# Patient Record
(demographics unavailable — no encounter records)

---

## 2017-02-15 NOTE — FL
EXAMINATION TYPE: FL guided pain mgmt statistic

 

DATE OF EXAM: 2/15/2017 9:48 AM

 

CLINICAL HISTORY: Neck pain.

 

TECHNIQUE: Fluoroscopy.

 

COMPARISON:  None.

 

FINDINGS:  Fluoroscopic guidance was provided during pain relief procedure performed by Dr. Bass .  
A total of 20 seconds of fluoroscopic time was utilized during the procedure and three spot images ar
e acquired. Images acquired shows needle localization  at multiple levels of the cervical facets.

 

IMPRESSION:  As Above.

## 2017-02-15 NOTE — P.PCN
Date of Procedure: 02/15/17


Surgeon: Neftaly Bass


Pathology: none sent


Condition: stable


Disposition: PACU


Description of Procedure: 


PREOPERATIVE DIAGNOSIS: Cervical spondylosis without myelopathy and facet 

arthropathy.





POSTOPERATIVE DIAGNOSIS: Cervical spondylosis without myelopathy and facet 

arthropathy.





PROCEDURES: Radiofrequency thermocoagulation, C2-C3, C3-C4 medial branch and 

third occipital nerve, with fluoroscopic guidance, right side.





ANESTHESIA: Local with 1% lidocaine; conscious sedation 





EBL: Minimal





PROCEDURE INDICATION: The patient with neck pain secondary to cervical 

arthropathy who had more than 50% relief of her pain with previous diagnostic 

cervical medial branch block; patient has also had multiple cervical RFAs with 

excellent relief. No use of blood thinners.





PROCEDURE DESCRIPTION / TECHNIQUE: The patient was seen and identified in the 

preoperative area. Risks, benefits, complications, and alternatives were 

discussed with the patient (with risks including but not limited to bleeding, 

infection, nerve damage, incomplete pain relief, and allergic reactions to 

medications), the patient agreed to proceed with the procedure and signed the 

informed consent after all questions were answered. IV was started. Vital signs 

remained stable throughout the procedure.





Patient was taken to the OR and time out was completed. The patient was placed 

in the prone position on the procedure table. A pillow was placed under the 

patients chest to increase the cervical interlaminar space. The cervical area 

was prepped and draped in the usual sterile fashion. Critical pause was taken. 

Vital signs were closely monitored during the procedure. Conscious sedation was 

used during the procedure to decrease patients anxiety. 





Using cross-table lateral fluoroscopy, the centroid of the trapezoid of C2, C3, 

and C4  were identified, marked, and localized with 1% lidocaine.  Subsequently

, a 21 gauge 100-mm radiofrequency cannula with a 5-mm active tip was advanced 

guided by fluoroscopy to the centroid of the trapezoid of C3 and C4, and the C2-

C3 facet joint. Needle tip position was confirmed at these locations with 

anteroposterior fluoroscopy. Each site then underwent sensory testing at 50 Hz 

and 0 to 1 volt  and motor testing at 2 Hz and 0 to 3 volt with local 

stimulation, but no radicular symptoms down the arm. Thereafter C3, C4, and TON 

sites underwent radiofrequency thermocoagulation  at 80 degrees celsius for 90 

seconds after injecting 0.5 ml of PF lidocaine 1%. After thermocoagulation, 1 

ml of the block solution containing Decadron 10 mg and 2 mL of preservative-

free normal saline was injected at the C3, C4, and TON levels after negative 

aspiration of CSF and blood and with no paresthesias. Cannulas were retracted 

while injecting lidocaine 1% until the needle is out. Skin was cleansed and 

bandages were applied.





COMPLICATIONS: No acute complications.





COMMENTS:





DISPOSITION / PLANS: The patient was placed in a supine position and  

transferred to the recovery area in a stable condition for observation  and was 

discharged from the recovery room after meeting discharge criteria. Home 

discharge instructions given to the patient by the staff. The patient was 

reexamined prior to discharge. The patient will schedule a follow up for left 

cervical RFA in 4-6 weeks.

## 2017-02-17 NOTE — CDI
Dear. Dr. Bass,



Per your procedure note, Conscious Sedation is documented.  The Pain Procedure 
Record, however, has MAC checked off under Anesthesia Plan.  



This is conflicting documentation that needs clarification for proper reporting 
purposes. 



Please clarify if the anesthesia provided for CHANTEL Garcia was MAC(Monitored 
Anesthesia Care) or Conscious/Moderate Sedation.



Please document this clarification on an addendum to the procedure note. 



Thank you for your time



Loretta Abreu,CPC

Outpatient 

[facility] [facility] [employer]

[email or web address]
[email or web address]

## 2017-05-02 NOTE — P.PN
Progress Note - Text


Patient returns for followup for chronic neck pain and headaches.  Patient 

underwent bilateral cervical RFA in February and April, and has gotten good 

relief from R side but not from left side.  Patient continues on Norco and 

Fioricet medications for pain with good relief.  Patient denies adverse drug 

effects from medications.  Today, pt denies new-onset weakness, bowel/bladder 

incontinence, or any other signs or symptoms of cauda equina syndrome. There 

are no signs of acute intoxication, and no indications of medication diversion 

or overuse.





In addition to above, 13-point review of systems is also negative for chest pain

, shortness of breath, changes in vision, changes in hearing, new onset weakness

, abdominal pain, diarrhea, extreme fatigue, malaise, fever, skin changes, 

homicidal or suicidal ideation, or bowel or bladder incontinence.





Vital Signs:  Reviewed in EMR


Gen:  WDWN, AAOx3, NAD


HEENT:  NCAT, EOMI, hearing grossly normal


Pulm:  resp unlabored


Abd:  soft, NT, ND





Neck:  supple, trachea midline





ROM in flexion cervical spine:  reduced


ROM in extension cervical spine: reduced  


Cervical paravertebral tenderness: +  


Cervical Facet tenderness:  ++


Spurling's:  + RUE > LUE


Upper extremity:  decreased  strength, decreased shoulder abduction ROM, 

and decreased elbow flexion/extension secondary to pain 





Neuro:  CN II-XII grossly intact, muscle strength lower extremities PRESERVED





Imaging:  Reviewed in EMR





Assessment:


1. cervical spondylosis without myelopathy


2. cervical spinal stenosis


3. chronic pain syndrome





Plan:





1. Explanation:  Opioid and psychological risk scores were reviewed.  Diagnoses

, prognoses, and multiple treatment options including but not limited to 

physical therapy, interventional therapies, adjuvant medical therapies, 

narcotic medication therapies, and surgery were discussed with the patient and 

all questions were answered to the patient's satisfaction.





2. Opioid agreement: Patient has previously signed narcotic agreement, and was 

orally counseled to not overuse, abuse, divert, or cell medications, and to 

take them as prescribed by only 1 healthcare provider.  The patient was also 

counseled to store opioid medications in a safe and preferably locked location.

  Patient was also counseled against driving or operating heavy equipment while 

using narcotic medications and also to not use alcohol or any illicit or 

recreational drugs.  The patient verbalized understanding that lack of 

compliance with any of the above and likely result in failure to renew narcotic 

prescriptions, possible discharge from the clinic, and possible legal 

ramifications thereafter if indicated.





3. Counseling:  The patient was counseled extensively on SMOKING CESSATION, 

BODY MASS INDEX, EXERCISE.  Specifically, the patient was instructed regarding 

the importance of smoking cessation, obesity, and exercise in the context of 

both chronic pain and overall health.





4. Procedures:  none for now





5. Consultations:  None





6. Investigations:  None





7. Medications:  Norco 7.5 #60 for two months' supply, Fioricet #30 for two 

months' supply





8. Disposition:  f/u for re-eval 8 weeks





PQRS measures:





1-Patient's medications are documented in the chart.


2-Tobacco use is positive, counseling given


3-Patient has not had a pneumococcal vaccine.


4-Advanced care planning discussed, patient unable to give.


5-Opioid contract signed with the patient today.


6-Pain positive, follow-up visit or procedure scheduled


7-Patient's blood pressure measured and documented, and patient will follow up 

with the primary care due to hypertension.


8-Patient's weight was measured, and body mass index ABOVE the normal limits, 

and counseling was done.  Patient instructed to follow up with PCP.


9-Patient WAS NOT identified as an unhealthy alcohol user.

## 2017-06-27 NOTE — P.PN
Progress Note - Text


Patient returns for followup for chronic neck pain and headaches.  Patient 

underwent bilateral cervical RFA in February and April, and has gotten good 

relief from R side but not from left side.  Patient continues on Norco and 

Fioricet medications for pain with good relief.  Patient denies adverse drug 

effects from medications.  Today, pt denies new-onset weakness, bowel/bladder 

incontinence, or any other signs or symptoms of cauda equina syndrome. There 

are no signs of acute intoxication, and no indications of medication diversion 

or overuse.





In addition to above, 13-point review of systems is also negative for chest pain

, shortness of breath, changes in vision, changes in hearing, new onset weakness

, abdominal pain, diarrhea, extreme fatigue, malaise, fever, skin changes, 

homicidal or suicidal ideation, or bowel or bladder incontinence.





Vital Signs:  Reviewed in EMR


Gen:  WDWN, AAOx3, NAD


HEENT:  NCAT, EOMI, hearing grossly normal, TTP over occipital ridges


Pulm:  resp unlabored


Abd:  soft, NT, ND





Neck:  supple, trachea midline





ROM in flexion cervical spine:  reduced


ROM in extension cervical spine: reduced  


Cervical paravertebral tenderness: +  


Cervical Facet tenderness:  ++


Spurling's:  + RUE > LUE


Upper extremity:  decreased  strength secondary to pain





Neuro:  CN II-XII grossly intact, muscle strength lower extremities PRESERVED





Imaging:  Reviewed in EMR





Assessment:


1. cervical spondylosis without myelopathy


2. cervical spinal stenosis


3. chronic pain syndrome





Plan:





1. Explanation:  Opioid and psychological risk scores were reviewed.  Diagnoses

, prognoses, and multiple treatment options including but not limited to 

physical therapy, interventional therapies, adjuvant medical therapies, 

narcotic medication therapies, and surgery were discussed with the patient and 

all questions were answered to the patient's satisfaction.





2. Opioid agreement: Patient has previously signed narcotic agreement, and was 

orally counseled to not overuse, abuse, divert, or cell medications, and to 

take them as prescribed by only 1 healthcare provider.  The patient was also 

counseled to store opioid medications in a safe and preferably locked location.

  Patient was also counseled against driving or operating heavy equipment while 

using narcotic medications and also to not use alcohol or any illicit or 

recreational drugs.  The patient verbalized understanding that lack of 

compliance with any of the above and likely result in failure to renew narcotic 

prescriptions, possible discharge from the clinic, and possible legal 

ramifications thereafter if indicated.





3. Counseling:  The patient was counseled extensively on SMOKING CESSATION, 

BODY MASS INDEX, EXERCISE.  Specifically, the patient was instructed regarding 

the importance of smoking cessation, obesity, and exercise in the context of 

both chronic pain and overall health.





4. Procedures:  none for now





5. Consultations:  None





6. Investigations:  None





7. Medications:  Norco 7.5 #60 + 7.5 #30 for three months' supply, Fioricet #30 

for two months' supply





8. Disposition:  f/u for re-eval 12 weeks





PQRS measures:





1-Patient's medications are documented in the chart.


2-Tobacco use is positive, counseling given


3-Patient has not had a pneumococcal vaccine.


4-Advanced care planning discussed, patient unable to give.


5-Opioid contract signed with the patient today.


6-Pain positive, follow-up visit or procedure scheduled


7-Patient's blood pressure measured and documented, and patient will follow up 

with the primary care due to hypertension.


8-Patient's weight was measured, and body mass index ABOVE the normal limits, 

and counseling was done.  Patient instructed to follow up with PCP.


9-Patient WAS NOT identified as an unhealthy alcohol user.

## 2017-09-19 NOTE — P.PN
Progress Note - Text


Patient returns for followup for chronic neck pain and headaches.  Patient 

underwent bilateral cervical RFA in February and April, and pain is starting to 

return.  Patient also had some right shoulder pain and was given a Medrol dose 

eneida which helped substantially.  Patient continues on Norco and Fioricet 

medications for pain with good relief.  Patient denies adverse drug effects 

from medications.  Today, pt denies new-onset weakness, bowel/bladder 

incontinence, or any other signs or symptoms of cauda equina syndrome. There 

are no signs of acute intoxication, and no indications of medication diversion 

or overuse.





In addition to above, 13-point review of systems is also negative for chest pain

, shortness of breath, changes in vision, changes in hearing, new onset weakness

, abdominal pain, diarrhea, extreme fatigue, malaise, fever, skin changes, 

homicidal or suicidal ideation, or bowel or bladder incontinence.





Vital Signs:  Reviewed in EMR


Gen:  WDWN, AAOx3, NAD


HEENT:  NCAT, EOMI, hearing grossly normal, TTP over occipital ridges


Pulm:  resp unlabored


Abd:  soft, NT, ND





Neck:  supple, trachea midline





ROM in flexion cervical spine:  reduced


ROM in extension cervical spine: reduced  


Cervical paravertebral tenderness: +  


Cervical Facet tenderness:  ++, L >> R


Spurling's:  + LUE


Upper extremity:  decreased  strength secondary to pain





Neuro:  CN II-XII grossly intact, muscle strength lower extremities PRESERVED





Imaging:  Reviewed in EMR





Assessment:


1. cervical spondylosis without myelopathy


2. cervical spinal stenosis


3. chronic pain syndrome





Plan:





1. Explanation:  Opioid and psychological risk scores were reviewed.  Diagnoses

, prognoses, and multiple treatment options including but not limited to 

physical therapy, interventional therapies, adjuvant medical therapies, 

narcotic medication therapies, and surgery were discussed with the patient and 

all questions were answered to the patient's satisfaction.





2. Opioid agreement: Patient has previously signed narcotic agreement, and was 

orally counseled to not overuse, abuse, divert, or cell medications, and to 

take them as prescribed by only 1 healthcare provider.  The patient was also 

counseled to store opioid medications in a safe and preferably locked location.

  Patient was also counseled against driving or operating heavy equipment while 

using narcotic medications and also to not use alcohol or any illicit or 

recreational drugs.  The patient verbalized understanding that lack of 

compliance with any of the above and likely result in failure to renew narcotic 

prescriptions, possible discharge from the clinic, and possible legal 

ramifications thereafter if indicated.





3. Counseling:  The patient was counseled extensively on SMOKING CESSATION, 

BODY MASS INDEX, EXERCISE.  Specifically, the patient was instructed regarding 

the importance of smoking cessation, obesity, and exercise in the context of 

both chronic pain and overall health.





4. Procedures:  none for now





5. Consultations:  None





6. Investigations:  None





7. Medications:  Norco 7.5 #60 for two months' supply, Fioricet #30 for two 

months' supply, baclofen 10 mg #90 with two refills





8. Disposition:  f/u for re-eval 12 weeks.  The patient was again warned 

regarding the synergistic effects of benzodiazepines and opioids in terms of 

sedation, nausea, and respiratory depression possibly resulting in death.  The 

patient verbalized understanding and acceptance of these risks.





PQRS measures:





1-Patient's medications are documented in the chart.


2-Tobacco use is positive, counseling given


3-Patient has not had a pneumococcal vaccine.


4-Advanced care planning discussed, patient unable to give.


5-Opioid contract signed with the patient today.


6-Pain positive, follow-up visit or procedure scheduled


7-Patient's blood pressure measured and documented, and patient will follow up 

with the primary care due to hypertension.


8-Patient's weight was measured, and body mass index ABOVE the normal limits, 

and counseling was done.  Patient instructed to follow up with PCP.


9-Patient WAS NOT identified as an unhealthy alcohol user.

## 2017-10-16 NOTE — P.PCN
Date of Procedure: 10/16/17


Surgeon: Neftaly Bass


Pathology: none sent


Condition: stable


Disposition: PACU


Description of Procedure: 


PREOPERATIVE DIAGNOSIS: Cervical spondylosis without myelopathy and facet 

arthropathy.





POSTOPERATIVE DIAGNOSIS: Cervical spondylosis without myelopathy and facet 

arthropathy.





PROCEDURES: Radiofrequency thermocoagulation, C2-C3, C3-C4 medial branch and 

third occipital nerve, with fluoroscopic guidance, left side.





ANESTHESIA: Local with 1% lidocaine; conscious sedation 





EBL: Minimal





PROCEDURE INDICATION: The patient with neck pain secondary to cervical 

arthropathy who had more than 50% relief of her pain with previous diagnostic 

cervical medial branch block; patient has also had multiple cervical RFAs with 

excellent relief in the past. No use of blood thinners.





PROCEDURE DESCRIPTION / TECHNIQUE: The patient was seen and identified in the 

preoperative area. Risks, benefits, complications, and alternatives were 

discussed with the patient (with risks including but not limited to bleeding, 

infection, nerve damage, incomplete pain relief, and allergic reactions to 

medications), the patient agreed to proceed with the procedure and signed the 

informed consent after all questions were answered. IV was started. Vital signs 

remained stable throughout the procedure.





Patient was taken to the OR and time out was completed. The patient was placed 

in the prone position on the procedure table. A pillow was placed under the 

patients chest to increase the cervical interlaminar space. The cervical area 

was prepped and draped in the usual sterile fashion. Critical pause was taken. 

Vital signs were closely monitored during the procedure. Conscious sedation was 

used during the procedure to decrease patients anxiety. 





Using cross-table lateral fluoroscopy, the centroid of the trapezoid of C2, C3, 

and C4  were identified, marked, and localized with 1% lidocaine.  Subsequently

, a 21 gauge 100-mm radiofrequency cannula with a 5-mm active tip was advanced 

guided by fluoroscopy to the centroid of the trapezoid of C3 and C4, and the C2-

C3 facet joint on the left side. Needle tip position was confirmed at these 

locations with anteroposterior fluoroscopy. Each site then underwent sensory 

testing at 50 Hz and 0 to 1 volt  and motor testing at 2 Hz and 0 to 3 volt 

with local stimulation, but no radicular symptoms down the arm. Thereafter C3, 

C4, and TON sites underwent radiofrequency thermocoagulation  at 80 degrees 

celsius for 90 seconds after injecting 0.5 ml of PF lidocaine 1%. After 

thermocoagulation, 1 ml of the block solution containing Decadron 10 mg and 2 

mL of preservative-free normal saline was injected at the C3, C4, and TON 

levels after negative aspiration of CSF and blood and with no paresthesias. 

Cannulas were retracted while injecting lidocaine 1% until the needle is out. 

Skin was cleansed and bandages were applied.





COMPLICATIONS: No acute complications.





COMMENTS:





DISPOSITION / PLANS: The patient was placed in a supine position and  

transferred to the recovery area in a stable condition for observation  and was 

discharged from the recovery room after meeting discharge criteria. Home 

discharge instructions given to the patient by the staff. The patient was 

reexamined prior to discharge and other than some pain at the injection site, 

there were no issues. 





The patient will schedule a follow up in the clinic in 4-6 weeks and was given 

a prescription for right shoulder X-ray.

## 2017-10-16 NOTE — FL
EXAMINATION TYPE: FL guided pain mgmt statistic

 

DATE OF EXAM: 10/16/2017

 

COMPARISON: NONE

 

HISTORY: Neck pain

 

TECHNIQUE: Fluoroscopy.

 

FINDINGS/IMPRESSION:  Fluoroscopic guidance was provided during procedure performed by Dr. Bass.  A 
total of 8 seconds of fluoroscopic time was utilized during the procedure and 2 spot images was acqui
red.

## 2017-11-14 NOTE — P.PN
Subjective


Progress Note Date: 11/14/17


Since 14 years FEMALE with a chronic history of severe neck pain today, as was 

cervical spondylosis with cervical facet arthropathy without myelopathy, we 

have none diagnostic medial branch block and it was positive and then later on 

we did radiofrequency ablation of the medial branch cervical area , last month 

we did radiofrequency ablation of the left side cervical area C2-C3/C3 4 and 

radiofrequency ablation of the left third occipital nerve, currently she 

reports that most of her pain in the right side on her left side pain improved 

significantly, she denies any motor or sensory deficit she denies any numbness 

or tingling sensation and no fever or night sweats





Objective





- Vital Signs


Vital signs: 


 Vital Signs











Temp  97.7 F   11/14/17 12:22


 


Pulse  104 H  11/14/17 12:22


 


Resp  18   11/14/17 12:22


 


BP  117/64   11/14/17 12:22


 


Pulse Ox  98   11/14/17 12:22








 Intake & Output











 11/13/17 11/14/17 11/14/17





 18:59 06:59 18:59


 


Weight   88.451 kg














- Exam


    


  Physical Examinations  :


    1-Constitutiona             : Cooperative , not in acute distress .


     2-HEENT                       :  nech ;  supple ,  no Lymphadenopathy  , 

normal  thyroid  size .


                                                  eyes  :  no ptosis , no 

icterus,  no photophobia .  


                                                  ENT  : normal    of hearing  

, normal  oropharynx     , no Thrush .  


    3- Respiratory               : Chest clear to auscultations Bilaterally  ,  

no wheezing   , no Rhonchi   .  


    4- Cardiovascular         : regular rate and rhythem , S1 ,  S2  ,   no  S3 

,  no  S4.


    5- Gastrointestinal       :  abdomen soft  no tenderness , bowel sounds 

positive all four quadrents   , no organomegally  .


    6- Genitourinary           :   Defferred .                                 

                                                                               

                                                                               

                                                                               

                                                                               

                       


    7- neurologic               :   Cranial nerve II   to  XII  intact ,  no   

focal neurological deffecit  .


    8-psychatric                   : alert ,  oriented  X 3  ,   appropriate 

affect   , intact judgment  and insight  .  


    9-Lymphatic                :    no Lymphadenopathy .


    10- musculoskeltal       :     


           cervical spine = motor  stregnth in the deltoid and biceps,   


                                           motor   stregnth biceps and the 

wrist extensors (C6)                                                           

                                                                               

                                                                               

                                                                               

                                                                               

                                                                               

                                                            .                  

                       motor stregnth in the triceps muscle . 


                                          deep tendon reflexes  normal at the 

biceps ,                                                                       

                                                                               

                                                                               

                                                                               

                                                                               

                                                 l                             

            normal   at Brachioradialis   normal at the triceps 


                                          positive cervical facet loading test .

( right side ) negative on the left side                                       

                                                                               

                                                                             , 

                                                                               

                                                                       


           Lumber spine =  normal moter stegnth lower extremities ,thigh and 

legs  .5/5 





Assessment and Plan


Plan: 


Assessment and plan=


   chronic neck pain  pain secondary to cervical degenerative disc disease  , 

cervical spondylosis with lumbar facet arthropathy , 


    chronic and current  use of high-risk medication (opioids)


   Patient denies any side effects of the current pain medication  and the 

current treatment/medication ML and the patient to do activity of daily living ,


   Diagnoses, prognosis, treatment options, including but not limited to 

physical therapy, medication management, interventional therapies, and surgery, 

were discussed with the patient


   All the questions answered


   Patient signed the narcotic agreement, and he was orally counseled, not to 

overuse, not to abuse, not to Divert , not tp sell pain medication, and to take 

it as prescribed only,


   Patient was counseled not to drive or operate heavy equipment while using 

narcotic medication, and advised not to use alcohol or any Illicit  drugs while 

using the narcotis, the patient's verbalized understanding that lack of 

compliance with any of the


   above instructions and will likely to cause discharge from the pain service, 

not to renew his narcotic prescriptions


    Medication managements= patient will be given prescription refills for 


      1-Fioricet


      2-Norco 7.5/325 every 8 hours dispense 60 


      3-baclofen 10 mg 3 times a day


  Interventional pain management= scaled patient to have radiofrequency 

ablation of the right-sided medial branch cervical area C2-C3/C3 4/third 

occipital nerve


  Refferal =


  Follow-up=  


                                      , 


Time with Patient: Less than 30

## 2018-01-08 NOTE — P.PCN
Date of Procedure: 01/08/18


Surgeon: Neftaly Bass


Pathology: none sent


Condition: stable


Disposition: PACU


Description of Procedure: 


PREOPERATIVE DIAGNOSIS: Cervical spondylosis without myelopathy and facet 

arthropathy.





POSTOPERATIVE DIAGNOSIS: Cervical spondylosis without myelopathy and facet 

arthropathy.





PROCEDURES: Radiofrequency thermocoagulation, C2-C3, C3-C4 medial branch and 

third occipital nerve, with fluoroscopic guidance, right side.





ANESTHESIA: Local with 1% lidocaine; conscious sedation 





EBL: Minimal





PROCEDURE INDICATION: The patient with neck pain secondary to cervical 

arthropathy who had more than 50% relief of her pain with previous diagnostic 

cervical medial branch block; patient has also had multiple cervical RFAs with 

excellent relief in the past. No use of blood thinners.





PROCEDURE DESCRIPTION / TECHNIQUE: The patient was seen and identified in the 

preoperative area. Risks, benefits, complications, and alternatives were 

discussed with the patient (with risks including but not limited to bleeding, 

infection, nerve damage, incomplete pain relief, and allergic reactions to 

medications), the patient agreed to proceed with the procedure and signed the 

informed consent after all questions were answered. IV was started. Vital signs 

remained stable throughout the procedure.





Patient was taken to the OR and time out was completed. The patient was placed 

in the prone position on the procedure table. A pillow was placed under the 

patients chest to increase the cervical interlaminar space. The cervical area 

was prepped and draped in the usual sterile fashion. Critical pause was taken. 

Vital signs were closely monitored during the procedure. Conscious sedation was 

used during the procedure to decrease patients anxiety. 





Using cross-table lateral fluoroscopy, the centroid of the trapezoid of C2, C3, 

and C4  were identified, marked, and localized with 1% lidocaine.  Subsequently

, a 21 gauge 100-mm radiofrequency cannula with a 5-mm active tip was advanced 

guided by fluoroscopy to the centroid of the trapezoid of C3 and C4, and the C2-

C3 facet joint on the right side. Needle tip position was confirmed at these 

locations with anteroposterior fluoroscopy. Each site then underwent sensory 

testing at 50 Hz and 0 to 1 volt  and motor testing at 2 Hz and 0 to 3 volt 

with local stimulation, but no radicular symptoms down the arm. Thereafter C3, 

C4, and TON sites underwent radiofrequency thermocoagulation  at 80 degrees 

celsius for 90 seconds after injecting 0.5 ml of PF lidocaine 1%. After 

thermocoagulation, 1 ml of the block solution containing Decadron 10 mg and 2 

mL of preservative-free normal saline was injected at the C3, C4, and TON 

levels after negative aspiration of CSF and blood and with no paresthesias. 

Cannulas were retracted while injecting lidocaine 1% until the needle is out. 

Skin was cleansed and bandages were applied.





COMPLICATIONS: No acute complications.





COMMENTS:





DISPOSITION / PLANS: The patient was placed in a supine position and  

transferred to the recovery area in a stable condition for observation  and was 

discharged from the recovery room after meeting discharge criteria. Home 

discharge instructions given to the patient by the staff. The patient was 

reexamined prior to discharge and there were no issues. 





The patient will schedule a follow up in the clinic in 4-6 weeks and was given 

a prescription for right shoulder X-ray.

## 2018-01-08 NOTE — FL
EXAMINATION TYPE: FL guided pain mgmt statistic

 

DATE OF EXAM: 1/8/2018

 

COMPARISON: NONE

 

HISTORY: Neck pain with cervical radiofrequency facet block

 

TECHNIQUE: Fluoroscopy.

 

FINDINGS/IMPRESSION:  Fluoroscopic guidance was provided during procedure performed by Dr. Bass.  A 
total of 24 seconds of fluoroscopic time was utilized during the procedure and 3 spot images was acqu
ired demonstrating localization at multiple cervical spine levels.

## 2018-03-08 NOTE — P.PN
Progress Note - Text


Progress Note Date: 03/08/18





This is a 40-year-old female with history of cervical spondylosis and 

cervicogenic headache.  The patient has been having increasing neck and 

bilateral shoulder pain and muscle spasm in these areas.  Her headache has 

improved since her last RFA for the third occipital nerve.  She uses Norco on 

fioricet sparingly.  She has a full job in customer service.


She denies any change in her bowel or bladder habits.  She denies any weakness 

in the upper or lower extremities.





In addition to above, 13-point review of systems is also negative for chest pain

, shortness of breath, changes in vision, changes in hearing, new onset weakness

, abdominal pain, diarrhea, extreme fatigue, malaise, fever, skin changes, 

homicidal or suicidal ideation, or bowel or bladder incontinence.





Vital Signs:  Reviewed in EMR


Gen:  WDWN, AAOx3, NAD


HEENT:  NCAT, EOMI, hearing grossly normal, TTP over occipital ridges


Pulm:  resp unlabored


Abd:  soft, NT, ND





Neck:  supple, trachea midline





ROM in flexion cervical spine:  reduced


ROM in extension cervical spine: reduced  


Cervical paravertebral tenderness: +  


Cervical Facet tenderness:  ++, L >> R





Neuro exam of the upper extremities showed normal and symmetrical muscle 

strength and deep tendon reflexes





Neuro:  CN II-XII grossly intact, muscle strength lower extremities PRESERVED





Imaging:  Reviewed in EMR





Assessment:


1. cervical spondylosis without myelopathy


2. cervical spinal stenosis


3. chronic pain syndrome





Plan:





1. Explanation:  Opioid and psychological risk scores were reviewed.  Diagnoses

, prognoses, and multiple treatment options including but not limited to 

physical therapy, interventional therapies, adjuvant medical therapies, 

narcotic medication therapies, and surgery were discussed with the patient and 

all questions were answered to the patient's satisfaction.





2. Opioid agreement: Patient has previously signed narcotic agreement, and was 

orally counseled to not overuse, abuse, divert, or cell medications, and to 

take them as prescribed by only 1 healthcare provider.  The patient was also 

counseled to store opioid medications in a safe and preferably locked location.

  Patient was also counseled against driving or operating heavy equipment while 

using narcotic medications and also to not use alcohol or any illicit or 

recreational drugs.  The patient verbalized understanding that lack of 

compliance with any of the above and likely result in failure to renew narcotic 

prescriptions, possible discharge from the clinic, and possible legal 

ramifications thereafter if indicated.





3. Counseling:  The patient was counseled extensively on SMOKING CESSATION, 

BODY MASS INDEX, EXERCISE.  Specifically, the patient was instructed regarding 

the importance of smoking cessation, obesity, and exercise in the context of 

both chronic pain and overall health.





4. Procedures:  Diagnostic cervical medial branch block for levels C5, C6, and 

C7 bilaterally





5. Consultations:  None





6. Investigations:  None





7. Medications:  Norco 7.5 #45 with one refill ' supply, Fioricet #30 with one 

refill.





8. Disposition:  f/u for re-eval 12 weeks.  The patient was again warned 

regarding the synergistic effects of benzodiazepines and opioids in terms of 

sedation, nausea, and respiratory depression possibly resulting in death.  The 

patient verbalized understanding and acceptance of these risks.





PQRS measures:





1-Patient's medications are documented in the chart.


2-Tobacco use is positive, counseling given


3-Patient has not had a pneumococcal vaccine.


4-Advanced care planning discussed, patient unable to give.


5-Opioid contract signed with the patient today.


6-Pain positive, follow-up visit or procedure scheduled


7-Patient's blood pressure measured and documented, and patient will follow up 

with the primary care due to hypertension.


8-Patient's weight was measured, and body mass index ABOVE the normal limits, 

and counseling was done.  Patient instructed to follow up with PCP.


9-Patient WAS NOT identified as an unhealthy alcohol user.

## 2018-04-04 NOTE — FL
Fluoroscopy

 

INDICATION: Pain

 

FINDINGS:

 

Fluoroscopy time: 32 seconds.

 

Images obtained: 3.

 

IMPRESSIONS:

1. Documentation of fluoroscopy.

## 2018-04-11 NOTE — P.PCN
Date of Procedure: 04/04/18


Surgeon: Neftaly Bass


Pathology: none sent


Condition: stable


Disposition: PACU


Description of Procedure: 


PREOPERATIVE DIAGNOSIS: Cervical spondylosis without myelopathy, cervicogenic 

headache.   





POSTOPERATIVE DIAGNOSIS: same





PROCEDURES: Diagnostic bilateral C5-C6, C6-C7 medial branch block with 

fluoroscopic guidance





ANESTHESIA: Local with 1% lidocaine; conscious sedation with Versed/fentanyl





EBL: Minimal





PROCEDURE INDICATION: This is a patient with neck pain and headaches secondary 

to cervical arthropathy unresponsive to more conservative treatments. No use of 

blood thinners.





PROCEDURE DESCRIPTION / TECHNIQUE:  The patient was seen and identified in the 

preoperative area. Risks, benefits, complications, and alternatives were 

discussed with the patient (including but not limited to incomplete pain relief

, bleeding, infection, nerve damage, and allergies to medications), the patient 

agreed to proceed with the procedure and signed the consent after all questions 

were answered.  IV was started. Vital signs remained stable throughout the 

procedure.





Patient was taken to the OR and time out was completed. The patient was placed 

in the prone position on the procedure table. A pillow was placed under the 

patients chest to increase the cervical interlaminar space. The cervical area 

was prepped and draped in the usual sterile fashion. Critical pause was taken. 

Vital signs were closely monitored during the procedure. Conscious sedation was 

used during the procedure to decrease patients anxiety. 





Using cross-table lateral fluoroscopy, the centroid of the trapezoid of right C5

, was identified, marked, and localized with 1% lidocaine.  Subsequently, a 22g 

3.5-inch spinal needle was advanced guided by fluoroscopy to the centroid of 

the trapezoid of C5. Needle tip position was confirmed at the centroid of the 

trapezoids of C5 with anteroposterior fluoroscopy. Subsequently, 1 ml of a 

combination of 10 mg Decadron and 3 ml of preservative-free Bupivacaine 0.5% 

was injected after negative aspiration for blood and CSF. Needle was then 

removed intact; the same procedure was repeated at the right C6 level and the 

left L5 and C6 levels.  I placed needles at the C7 level on both sides but 

ultimately did not inject at the C7 levels because they were extremely 

difficult to visualize on lateral view due to patient's shoulders.





COMPLICATIONS: No acute complications.





COMMENTS:





DISPOSITION / PLANS: The patient was placed in a supine position and 

transferred to the recovery area in a stable condition for observation and was 

discharged from the recovery room after meeting discharge criteria. Home 

discharge instructions given to the patient by the staff. The patient was 

reexamined prior to discharge. The patient will schedule a repeat CMBB in 3-4 

weeks if she has relief from this procedure.

## 2018-05-03 NOTE — P.PAINPG
Subjective


Progress Note Date: 05/03/18


Very pleasant 40-year-old woman with a long-standing history of bilateral neck 

pain.  She reports she has received excellent benefit from previous radio 

frequency ablations of the cervical medial branch nerves.  She did undergo a 

diagnostic cervical medial branch nerve block recently reported this afforded 

her good relief.  She is asking if she could undergo radio frequency ablation 

to help relieve her symptoms.  She does admit that she takes pain medicines as 

prescribed and they do help her.  She also occasionally takes Xanax as 

prescribed by her primary care physician to help her treat anxiety related to 

going to sleep.  She also complains of significant numbness in her hands and 

wakes up with her hands nominally when she is driving reports numbness in her 

first 2 fingers.  She denies any cervical radicular symptoms or any lower 

extremity symptoms.








Objective





- Vital Signs


Vital signs: 


 Intake & Output











 05/02/18 05/03/18 05/03/18





 18:59 06:59 18:59


 


Weight   92.986 kg














- Exam


 Physical Examination :


                                   1-Constitutional : Cooperative , not in 

acute distress .  She is not sedated.


                                   2-HEENT          :  No obvious masses.  

Normocephalic.  


                                   3- Respiratory     : Chest clear to 

auscultation.  


                                   4- Cardiovascular : regular rate and rhythm. 


                                   5- Gastrointestinal: abdomen soft  no 

tenderness , bowel sounds positive all four quadrants, no organomegaly  .


                                   6- Genitourinary   :  Deferred .


                                   7- Integumentary   : No cellulitis ,  no 

ulcers, normal skin turgor, no cyanotic.


                                   8- neurologic       :   No focal neurologic 

deficits.  No obvious motor deficits.


                                   9- psychatric        : alert,  oriented,  

appropriate affect   , intact judgment  and insight  .  Affect congruent with 

situation.  


                                   10- musculoskeltal:  normal gait, cervical 

facet loading maneuvers are positive bilaterally.  There are also some trigger 

points present.  These are in the trapezius muscles bilaterally.








Assessment and Plan


Plan: 


Follow-up plan:





Medications: I reviewed the patient's mass report.  It reveals expected results 

with regard to prescription of opioids.  She also receives approximately 30 

Xanax a months from her primary care physician.  I discussed with her the 

dangers of interaction of these medications.  She will discontinue the 

benzodiazepines.


Procedures: We will schedule the patient for right cervical medial branch radio 

frequency ablation.  She will then follow up for left cervical medial branch 

ablation.  These will be done at the C2, C3 and C4 levels.


Referrals: I refer the patient back to her primary care physician for an 

evaluation and referral to a hand specialist for her presumed carpal tunnel 

syndrome


Additional tests ordered: None


Next follow-up visit: Patient will schedule radio pregnancy ablation of the 

right cervical medial branch nerves.  I also discussed with her [facility] 

policy regarding medication management.  She will need to be seen every month.  

I also explained to her that moving forward she was able to discontinue her 

benzodiazepines as we will be unable to write opioids for her if she is taking 

these medications.  She voiced understanding of this policy and will comply.








PQRS Measure Charge Sheet


Measure #130: Documentation of Current Meds in Medical Chart: Patient's 

medications documented in chart


Measure #226: Tobacco Use: Screen & Cessation Intervention: Pt not a tobacco 

user


Measure #111: Pneumonia Vaccination: Pneumococcal vaccine NOT administered or 

previously given


Measure #47: Advance Care Plan: Advance care planning discussed & documented, 

pt chose/unable to give


Measure #412: Opioid Treatment Agreement: Documented signed opioid trtmnt 

agreemnt min once during opioid trtmnt


Measure #408: Opioid Therapy Follow-up Evaluation: Patient had f/u eval minimum 

every 3 months during opioid therapy


Measure #317: Preventitive Care & Scrn High Bld Press & F/U: Normal blood 

pressure, f/u not required


Measure #128: Body Mass Index (BMI) Screening & Follow-up: BMI documented ABOVE 

normal parameters - f/u documented


Measure #131: Pain Assessment & Follow-up: Pain positive & plan documented


Measure #431: Unhealthy Alcohol Use Preventative Care & Scrn: Patient not 

identified as an unhealthy alcohol user


PQRS Narrative: 


 





Smoking Status                   Current every day smoker


Narcotic Agreement Date Signed   03/08/18


Hx Alcohol Use (MH)              No








Home Medications: 


Ambulatory Orders





Thyroid,Pork [[place] Thyroid] 180 mg PO DAILY 06/11/14 


ALPRAZolam [Xanax] 1 mg PO DAILY PRN 08/11/14 


Ibuprofen [Motrin] 800 mg PO DAILY PRN 12/20/16 


Garlic 1 tab PO DAILY 02/10/17 


Omega 3 Fish Oil 1 tab PO DAILY 02/10/17 


Red Yeast Rice  1 tab PO DAILY 02/10/17 


Citalopram Hydrobromide [CeleXA] 20 mg PO DAILY 05/02/17 


Baclofen [Lioresal] 10 mg PO TID PRN #60 tablet 11/14/17 


Buta/APAP/Caf/Cod -13-30 [Fioricet w/Cod -55-30MG] 1 each PO Q6HR 

PRN #30 cap 11/14/17 


HYDROcodone/APAP 7.5-325MG [Norco 7.5-325] 1 tab PO BID PRN #60 tab 11/14/17 











Controlled Substance Measures





- Controlled Substance Measures


Is patient prescribed a controlled substance at discharge?: Yes


If prescribed controlled substance>3 days was MAPS reviewed?: Yes


When asked, does pt state using other controlled substances?: Yes
needs device/needed assist

## 2018-06-04 NOTE — P.PCN
Date of Procedure: 06/04/18


Procedure(s) Performed: 





PREOPERATIVE DIAGNOSIS:1- Cervical  Spondylosis with Facet Arthropathy.without 

myelopathy.  2-cervicogenic headache





POSTOPERATIVE DIAGNOSIS: same as pre op diagnosis .





PROCEDURES: Diagnostic bilateral C5-6, and C6-7 medial branch blocks, with 

fluoroscopic guidance ( 2nd diagnostic block  for these leveles )





ANESTHESIA: Local with 1% lidocaine 4 ml ; moderate sedation with Versed. 2 mg 

, and fentanyl  100  micrograms





EBL: Minimal





PROCEDURE INDICATION: The patient with neck pain secondary to cervical 

arthropathy unresponsive to more conservative treatments. 





PROCEDURE DESCRIPTION / TECHNIQUE: The patient was seen and identified in the 

preoperative area. Risks, benefits, complications, and alternatives were 

discussed with the patient, the patient agreed to proceed with the procedure 

and signed the consent. IV was started. Vital signs remained stable throughout 

the procedure.





Patient was taken to the OR and time out was completed. The patient was placed 

in the prone position on the procedure table. A pillow was placed under the 

patients chest to increase the cervical interlaminar space. The cervical area 

was prepped and draped in the usual sterile fashion. Critical pause was taken. 

Vital signs were closely monitored during the procedure. Conscious sedation was 

used during the procedure to decrease patients anxiety. 





Using cross-table lateral fluoroscopy, the centroid of the trapezoid of right  

, C5 and  C6, was identified, marked, and localized with 1% lidocaine 1 ml at 

each level for skin and  Sub Q infiltrations .  Subsequently, a 22 G  2 spinal 

needle was advanced guided by fluoroscopy to the centroid of the trapezoid of  

  Right , C5, C6 . Needles tip position was confirmed at the centroid of the 

trapezoids of  Right  C5 ,C6  with anteroposterior fluoroscopy. Subsequently,  

1  ml of preservative-free Ropivacaine 0.5% mixed with kenalog 20 mg   and half 

ml of the mixture was injected after negative aspiration for blood and CSF. 

Needles was then removed intact the same procedure was repeated at the  left  C5

-6, and C6-7 levels.





COMPLICATIONS: No acute complications.





COMMENTS:





DISPOSITION / PLANS: The patient was placed in a supine position and 

transferred to the recovery area in a stable condition for observation  and was 

discharged from the recovery room after meeting discharge criteria. Home 

discharge instructions given to the patient by the staff. The patient was 

reexamined prior to discharge. The patient will schedule a follow up in the 

clinic in 2-4 weeks.

## 2018-06-04 NOTE — FL
EXAMINATION TYPE: FL guided pain mgmt statistic

 

DATE OF EXAM: 6/4/2018

 

COMPARISON: NONE

 

HISTORY:   Neck pain 

 

TECHNIQUE: Fluoroscopy.

 

FINDINGS/IMPRESSION:  Fluoroscopic guidance was provided during procedure performed by Dr. Anguiano. 
 A total of 23 seconds of fluoroscopic time was utilized during the procedure and 4 spot images was a
cquired demonstrating multilevel localization of the cervical spine.

## 2018-07-03 NOTE — P.PAINPG
Subjective


Progress Note Date: 07/02/18


This is follow-up visit for this patient with a history of severe and chronic 

neck pain and headache diagnosed with cervical spondylosis and cervicogenic 

headache


We have done interventional pain procedures diagnostic medial branch block 

cervical area C56 and C6 7 levels first one was done in 04/04/2018 she gets 

more than 50% decrease in her neck pain and a second diagnostic medial branch 

block done in Jones 4 2018 she got more than 50% decrease in neck pain  ,pain 

dropped from 7/10-3/10,on  both occasions


Patients currently on Norco 7.5/325 every 8 hours, baclofen 10 mg 3 times a day

, Motrin 800 mg every 8 hours, Fioricet every 6 hours when necessary


Patient denies any side effects of the medication, denies excessive drowsiness 

or sleepiness, denies suicidal ideation, and reports that the current pain 

medication is  helping to control the  pain 


and improve activity of daily living 


Patient denies any motor or sensory deficit , patient denies any fever or night 

sweats, denies any change in the bowel movements or urination


 


Physical Examinations  :


1-Constitutional : Cooperative , not in acute distress .


2-HEENT :  nech ;  supple ,  no Lymphadenopathy  , no Thyromegaly  , normal  

thyroid  size .


                                       eyes  :  no ptosis , no icterus,  no 

photophobia .  


                                       ENT  : normal    of hearing  , normal  

oropharynx     , no Thrush .  


3- Respiratory : Chest clear to auscultations Bilaterally  ,  no wheezing   , 

no Rhonchi   .  


4- Cardiovascular :  regular rate and rhythem , S1 ,  S2  ,   no  S3 ,  no  S4.


5- Gastrointestinal : abdomen soft  no tenderness , bowel sounds positive all 

four quadrents   , no organomegally  .


6- Genitourinary :  Defferred .                                                

                                                                               

                                                                               

                                                                               

                                                                               

        


7- neurologic:  Cranial nerve II   to  XII  intact ,  no   focal neurological 

deffecit  .


8- Psychatric: alert ,  oriented  X 3  ,   appropriate affect   , intact 

judgment  and insight  .  


9- Lymphatic             :  no Lymphadenopathy .


10- Musculoskeltal :     


       exams of the cervical spine = motor strength normal  bilateral upper 

extremities 


                                                  facet loading test cervical 

area   positive.                                                               

                                                                               

                                                                               

                                                                               

                                                                               

                                   


       exams of the Lumber spine  =motor strength  lower extremities ,thigh and 

legs  .5/5 


Assessment and plan =


       Chronic neck pain, and headaches secondary to cervical spondylosis/

cervicogenic headache


       Patient had good result (diagnostic medial branch blocks C 56, C6 7 level

, she would be good candidate to have radiofrequency ablation of the medial 

branch


       chronic and current use of high-risk medication (Opioids).


       The patient was counseled about risk of opioid use, psychological risk 

associated with opioids and was orally counseled to not overuse ,


       divert,or sell dictations to take medications as prescribed only , and 

to restore medication in  safe location  ,                         


        the  patient counseled against driving while using narcotic medications

, and also not to use alcohol or any illicit recreational drugs,  


        patient's verbalized understanding that the lack of compliance will 

result in failure to renew narcotic prescription 


       and    possible discharge from the clinic                               

                                                                               

                                                                               

                                                                               

                                                                               

                                                                               

                                                                               

                                                                               

                                                  -      diagnoses, prognosis, 

and treatment options including but not limited to physical therapy, surgical 

interventions, interventional therapies ,  


       and medication management including narcotics and adjuvant medication  

were discussed with the patient and all the questions answered 


        Prescription refill for Norco 7.5/325 every 8 hours dispense 60, with 

one refill, Motrin 800 mg every 8 hours dispense 90 with 1 refill, baclofen 10 

mg 3 times a day dispense 90 with 1 


        refill, Fioricet every 6 hours dispensed 30 with one refill


       Labs reviewed and it was appropriate and urine drug screen was ordered


                                                  











Objective





- Vital Signs


Vital signs: 


 Vital Signs











Temp      


 


Pulse  82   07/02/18 12:06


 


Resp  16   07/02/18 12:06


 


BP  120/77   07/02/18 12:06


 


Pulse Ox  97   07/02/18 12:06








 Intake & Output











 07/02/18 07/02/18 07/03/18





 06:59 18:59 06:59


 


Weight  95.254 kg 














PQRS Measure Charge Sheet


Measure #130: Documentation of Current Meds in Medical Chart: Patient's 

medications documented in chart


Measure #226: Tobacco Use: Screen & Cessation Intervention: Pt screened for 

tobacco use AND intervention given


Measure #111: Pneumonia Vaccination: Pneumococcal vaccine NOT administered or 

previously given


Measure #47: Advance Care Plan: Advance care planning discussed & documented, 

plan or surrogate given


Measure #412: Opioid Treatment Agreement: Documented signed opioid trtmnt 

agreemnt min once during opioid trtmnt


Measure #408: Opioid Therapy Follow-up Evaluation: Patient had f/u eval minimum 

every 3 months during opioid therapy


Measure #317: Preventitive Care & Scrn High Bld Press & F/U: Normal blood 

pressure, f/u not required


Measure #128: Body Mass Index (BMI) Screening & Follow-up: BMI documented ABOVE 

normal parameters - f/u documented


Measure #131: Pain Assessment & Follow-up: Pain positive & plan documented, 

Follow-up scheduled


Measure #431: Unhealthy Alcohol Use Preventative Care & Scrn: Patient not 

identified as an unhealthy alcohol user


PQRS Narrative: 


 





Smoking Status                   Current every day smoker


Do You Want the Pneumonia        No


Vaccine AT THIS TIME?            


Narcotic Agreement Date Signed   03/08/18


Blood Pressure                   120/77


Pain Intensity [Bilateral        3


Posterior Neck]                  


Scale Used                       Numeric (1 - 10)


Hx Alcohol Use (MH)              No








Home Medications: 


Ambulatory Orders





Thyroid,Pork [[place] Thyroid] 180 mg PO DAILY 06/11/14 


ALPRAZolam [Xanax] 1 mg PO DAILY PRN 08/11/14 


Ibuprofen [Motrin] 800 mg PO DAILY PRN 12/20/16 


Garlic 1 tab PO DAILY 02/10/17 


Omega-3 Fatty Acids [Omega-3] 1,000 mg PO DAILY 02/10/17 


Red Yeast Rice 600 mg PO DAILY 02/10/17 


Citalopram Hydrobromide [CeleXA] 20 mg PO DAILY 05/02/17 


Baclofen [Lioresal] 10 mg PO TID PRN #60 tablet 07/02/18 


Buta/APAP/Caf/Cod -94-30 [Fioricet w/Cod -34-30MG] 1 each PO Q6HR 

PRN #30 cap 07/02/18 


HYDROcodone/APAP 7.5-325MG [Norco 7.5-325] 1 tab PO BID PRN #60 tab 07/02/18 


HYDROcodone/APAP 7.5-325MG [Norco 7.5-325] 1 tab PO Q12H PRN 30 Days #60 tab 07/ 02/18 


Ibuprofen [Motrin] 800 mg PO Q8HR PRN #60 tab 07/02/18 











Controlled Substance Measures





- Controlled Substance Measures


Is patient prescribed a controlled substance at discharge?: Yes


When asked, does pt state using other controlled substances?: Yes


If prescribed controlled substance>3 days was MAPS reviewed?: Yes


If Rx opioid, was Start Talking consent form obtained?: Yes


If opioid is for acute pain is fill amount 7 days or less?: No


Was information provided regarding opioid addiction?: Yes

## 2018-07-26 NOTE — P.PCN
Date of Procedure: 07/26/18


Surgeon: Lalo Esparza


Description of Procedure: 


PREOPERATIVE DIAGNOSIS: Cervical spondylosis





POSTOPERATIVE DIAGNOSIS: Same





PROCEDURES: Right C2, C3, C4 Radiofrequency thermocoagulation, with 

fluoroscopic guidance





SURGEON:  Lalo Esparza MD.





ANESTHESIA:  Moderate sedation with intravenous versed 2  mg and fentanyl 100  

mcg and local infiltration with lidocaine 1% 4 ml 





EBL: Minimal 





PROCEDURE INDICATION: The patient with neck pain secondary to cervical facet  

spondylosis who had more than 50% relief of pain with previous diagnostic 

cervical medial branch block.  Her pain involves her posterior occiput as well 

as her neck down to the junction of her neck and shoulder.








PROCEDURE DESCRIPTION / TECHNIQUE: The patient was seen and identified in the 

preoperative area. Risks, benefits, complications, including but not limited to 

risk of infection ,bleeding , allergic reactions to the medications, incomplete 

pain relief, nerve damage and headache Alternatives were discussed with the 

patient, the patient agreed to proceed with the procedure and signed the 

consent. IV was started.  The operative site was marked.





Patient was taken to the OR and time out was completed. The patient was placed 

in the prone position on the procedure table. The lumber  area was prepped and 

draped in the usual sterile fashion. . Vital signs were closely monitored 

during the procedure .IV sedation was used during the procedure to decrease 

patients anxiety. 





Using AP and then oblique fluoroscopy, the articular waist of the corresponding 

cervical vertebral bodies for  the above-mentioned levels were identified, 

marked, and localized with 1% lidocaine.  Subsequently, a 20  [id number] 

radiofrequency cannula with a 10-mm active tip was advanced guided by 

fluoroscopy to the midpoint of the trapezoid formed on the lateral view of the 

cervical vertebral bodies at each site then underwent sensory testing at 50 Hz 

and 0 to 1 volt  and motor testing at 2.5  Hz and 0 to 3 volt with local 

stimulation, but no radicular symptoms down the legs. Then the sites underwent 

radiofrequency thermocoagulation  at 80 degrees celsius for 90 seconds after 

injecting 0.5 ml of PF lidocaine 1%.   After the  thermocoagulation done , 1 ml 

of the block solution containing depomedrol 40 mg and 4 ml of maraine 0.5%  was 

injected at each levels after negative aspiration of CSF and blood and with no 

paresthesias. Cannulas were retracted while injecting lidocaine 1% until the 

needle is out.. 





At the end of the procedure, the skin was cleansed and bandages were applied.





COMPLICATIONS: No acute complications.





DISPOSITION / PLANS: The patient was placed in a supine position and  

transferred to the recovery area in a stable condition for observation  and was 

discharged from the recovery room after meeting discharge criteria. Home 

discharge instructions given to the patient by the staff.

## 2018-07-26 NOTE — FL
EXAMINATION TYPE: FL guided pain mgmt statistic

 

DATE OF EXAM: 7/26/2018

 

HISTORY: Pain

 

CERVICAL FACET BLOCK STEROID INJ 6 SECS AND 1 PAPER

## 2018-08-13 NOTE — P.GSHP
History of Present Illness


H&P Date: 08/13/18





40 old female who presents for left cervical radio frequency ablation of third 

occipital nerve, C3-C4.  Patient had a right-sided done 2 weeks ago, and states 

that she had excellent relief from the procedure.  Patient denies any bowel or 

bladder incontinence, pain is a 6 out of 10 in severity on the left side.





Vital signs: Stable and reviewed


physical exam


Gen.: Alert and oriented 3 no acute distress


Respiratory: Nonlabored, no wheezing


Cardiac: S1-S2, no edema





Plan:


1.  Left cervical radio pharmacy ablation of third occipital nerve, C3-C4.  

With fluoroscopy








Past Medical History


Past Medical History: Neurologic Disorder, Thyroid Disorder


Additional Past Medical History / Comment(s): Neck pain. Hx migraines, IBS, low 

BP, prior enlarged thyroid.


History of Any Multi-Drug Resistant Organisms: None Reported


Past Surgical History: Tubal Ligation


Additional Past Surgical History / Comment(s): Hx thyroidectomy, exploratory 

abdominal surgery, cyst removed from tongue.  PAIN CLINIC PROCEDURES


Past Anesthesia/Blood Transfusion Reactions: Previous Problems w/ Anesthesia, 

Family History of Problems w/ Anesthesia


Additional Past Anesthesia/Blood Transfusion Reaction / Comment(s): Mother-

PONV.  PT CRIES WHEN WAKING UP FROM ANESTHESIA


Smoking Status: Current every day smoker





- Past Family History


  ** Mother


Family Medical History: No Reported History





Medications and Allergies


 Home Medications











 Medication  Instructions  Recorded  Confirmed  Type


 


Thyroid,Pork [[place] Thyroid] 180 mg PO DAILY 06/11/14 08/13/18 History


 


ALPRAZolam [Xanax] 1 mg PO DAILY PRN 08/11/14 08/13/18 History


 


Garlic 1 tab PO DAILY 02/10/17 08/13/18 History


 


Omega-3 Fatty Acids [Omega-3] 1,000 mg PO DAILY 02/10/17 08/13/18 History


 


Citalopram Hydrobromide [CeleXA] 20 mg PO DAILY 05/02/17 08/13/18 History


 


Baclofen [Lioresal] 10 mg PO TID PRN #60 tablet 07/02/18 08/13/18 Rx


 


Buta/APAP/Caf/Cod -91-30 1 each PO Q6HR PRN #30 cap 07/02/18 08/13/18 Rx





[Fioricet w/Cod -19-30MG]    


 


HYDROcodone/APAP 7.5-325MG [Norco 1 tab PO Q12H PRN 30 Days #60 tab 07/02/18 08/ 13/18 Rx





7.5-325]    


 


Ibuprofen [Motrin] 800 mg PO Q8HR PRN #60 tab 07/02/18 08/13/18 Rx











 Allergies











Allergy/AdvReac Type Severity Reaction Status Date / Time


 


Sulfa (Sulfonamide Allergy  Rash/Hives Verified 08/07/18 09:06





Antibiotics)     














Surgical - Exam


 Vital Signs











Pulse Resp BP Pulse Ox


 


 96   16   104/60   97 


 


 08/13/18 08:57  08/13/18 08:57  08/13/18 08:57  08/13/18 08:57

## 2018-08-13 NOTE — FL
EXAMINATION TYPE: FL guided pain mgmt statistic

 

DATE OF EXAM: 8/13/2018

 

COMPARISON: NONE

 

HISTORY: Neck pain

 

TECHNIQUE: Fluoroscopy.

 

FINDINGS/IMPRESSION:  Fluoroscopic guidance was provided during procedure performed by Dr. Ferreira.  A
 total of 2 minutes and 11 seconds of fluoroscopic time was utilized during the procedure and 4 spot 
images was acquired demonstrating localization of the cervical spine from a posterior approach.

## 2018-08-31 NOTE — P.PCN
Date of Procedure: 08/13/18


Description of Procedure: 








PREOPERATIVE DIAGNOSIS: Cervical spondylosis without myelopathy








POSTOPERATIVE DIAGNOSIS: Cervical spondylosis without myelopathy





PROCEDURES: Radiofrequency thermocoagulation, left C2, C3, C4





ANESTHESIA: Local with 1% lidocaine; IV sedation with  fentanyl and Versed. 





EBL: Minimal





PROCEDURE INDICATION: The patient with neck pain secondary to cervical 

arthropathy who had more than 50% relief of her pain with previous diagnostic 

cervical medial branch block. 





PROCEDURE DESCRIPTION / TECHNIQUE: The patient was seen and identified in the 

preoperative area. Risks, benefits, complications, and alternatives were 

discussed with the patient, the patient agreed to proceed with the procedure 

and signed the consent. IV was started. Vital signs remained stable throughout 

the procedure.





Patient was taken to the OR and time out was completed. The patient was placed 

in the prone position on the procedure table. A pillow was placed under the 

patients chest to increase the cervical interlaminar space. The cervical 

area was prepped and draped in the usual sterile fashion. Critical pause was 

taken. Vital signs were closely monitored during the procedure. Conscious 

sedation was used during the procedure to decrease patients anxiety. 





Using cross-table lateral fluoroscopy, the centroid of the trapezoid of third 

occipital nerve, C3, and  C4, were identified, marked, and localized with 1% 

lidocaine.  Subsequently, a 20 [id number] radiofrequency cannula with a 10-mm 

active tip was advanced guided by fluoroscopy to the centroid of the trapezoid 

of an occipital nerve C3, and C4 . Needle tip position was confirmed at the 

centroid of the trapezoids of third occipital nerve, C3, and C4 with 

anteroposterior fluoroscopy. Each site then underwent sensory testing at 50 Hz 

and 0 to 1 volt  and motor testing at 2 Hz and 0 to 3 volt with local 

stimulation, but no radicular symptoms down the arm. Thereafter C3, C4,C5 and 

C6 sites underwent radiofrequency thermocoagulation  at 80 degrees celsius for 

90 seconds after injecting 0.5 ml of PF lidocaine 1%. After thermocoagulation, 

1 ml of the block solution containing Kenalog 40 mg and 5 mL of preservative-

free normal saline was injected at the  third occipital nerve C3,  and C4 

levels after negative aspiration of CSF and blood and with no paresthesias. 

Cannulas were retracted while injecting lidocaine 1% until the needle is out. 

Skin was cleansed and bandages were applied.





COMPLICATIONS: No acute complications.





COMMENTS:





DISPOSITION / PLANS: The patient was placed in a supine position and  

transferred to the recovery area in a stable condition for observation  and was 

discharged from the recovery room after meeting discharge criteria. Home 

discharge instructions given to the patient by the staff. The patient was 

reexamined prior to discharge. The patient will schedule a follow up in the 

clinic in 2-4 weeks.

## 2018-09-06 NOTE — CDI
Date:  9/6/18



CDS/ Name:  Loretta Abreu

Phone: If any questions, call Nandini Ivory  at [phone]



Patient Name:  Vianey Garcia  

Account Number:  [id number]

Admit Date:     8//13/18

Discharge Date: 8/13/18

   

ATTENTION: The [facility] Coding Staff appreciate your assistance in clarifying 
documentation. Please respond to the clarification below the line at the bottom 
and electronically sign. The [facility] Coding staff will review the response and 
follow-up if needed. Please note: Queries are made part of the [facility] 
Record. If you have any questions, please contact the .



Dear Dr. Ferreira,



Please provide clarification on which levels of the cervical spine the 
procedure was performed.  On the operative under Procedures: it is stated that 
the procedure is performed at C2, C3, C4. Also on the operative report under 
Procedure Description /Technique in the third paragraph it is stated the the 
radiofrequency thermocoagulation is performed at C3, C4, C5 and C6.  



Please provide the level of the cervical spine where the procedure was 
performed.  





Thank you for your kind consideration.



_____________________________________________________________________
MTDD

## 2018-09-10 NOTE — P.PN
Subjective


Progress Note Date: 09/10/18


Principal diagnosis: 


Cervical spondylosis without myelopathy





This is a 40-year-old lady with history of neck pain status post cervical 

medial branch RFA.  We'll the patient still takes Norco 7.5 mg twice a day as 

needed for her pain and Fioricet with codeine once a day or once every other 

day as needed for her migraine headaches.





Today she atarted to feel pain in her right elbow beneath the level of the 

right olecranon.


Today, pt denies new-onset weakness, bowel/bladder incontinence, or any other 

signs or symptoms of cauda equina syndrome. There are no signs of acute 

intoxication, and no indications of medication diversion or overuse.





In addition to above, 13-point review of systems is also negative for chest pain

, shortness of breath, changes in vision, changes in hearing, new onset weakness

, abdominal pain, diarrhea, extreme fatigue, malaise, fever, skin changes, 

homicidal or suicidal ideation, or bowel or bladder incontinence.





Vital Signs:  Reviewed in EMR


Gen:  AAOx3, NAD


HEENT:  PERRLA,hearing grossly normal


Pulm:  resp unlabored,CTA


Heart:S1,S2, No Mur








Neck:  supple, trachea midline


Neuro exam of the upper extremities: Areflexia, normal muscle strength 

bilaterally and symmetrically


Normal cervical spine range of motion with more pain with extension of the 

spine.





Neuro:  CN II-XII grossly intact,


Mild tenderness in the right elbow area beneath the elbow joint





Imaging:  Reviewed in EMR/chart





Assessment:


Cervical spondylosis without myelopathy











Plan:





1. Explanation:  Opioid and psychological risk scores were reviewed.  Diagnoses

, prognoses, and multiple treatment options including but not limited to 

physical therapy, interventional therapies, adjuvant medical therapies, 

narcotic medication therapies, and surgery were discussed with the patient and 

all questions were answered to the patient's satisfaction.





2. Opioid agreement: Signed with the patient and the patient is warned not to 

use opioids while driving or before driving and not to combine opioids with 

benzodiazepines or alcohol.





3. Counseling:  The patient was counseled extensively on SMOKING CESSATION, 

BODY MASS INDEX, EXERCISE.  Specifically, the patient was instructed regarding 

the importance of smoking cessation, obesity, and exercise in the context of 

both chronic pain and overall health.





4. Procedures:  None at this point





5. Consultations:  None.  She'll we have the beginning of bursitis in the right 

elbow and if her pain continues she might need steroid injection in the area.





6. Investigations:  None





7. Medications: Continue Norco 7.5 mg twice a day, baclofen,.  Sed rate with 

codeine once a day if needed and Motrin 





8. Disposition: Return to clinic in 8 weeks  





9.  Maps were reviewed and were appropriate.











Objective





- Vital Signs


Vital signs: 


 Vital Signs











Temp      


 


Pulse  89   09/10/18 13:09


 


Resp  18   09/10/18 13:09


 


BP  121/71   09/10/18 13:09


 


Pulse Ox  98   09/10/18 13:09








 Intake & Output











 09/09/18 09/10/18 09/10/18





 18:59 06:59 18:59


 


Weight   95.254 kg

## 2018-11-15 NOTE — P.PN
Subjective


Progress Note Date: 11/14/18





Patient returns for followup for chronic neck pain and headaches.  we have done 

bilateral cervical medial branch RFA .  Patient also had some right shoulder 

pain and was given a Medrol dose eneida which helped substantially.  Patient 

continues on Norco and Motrin 800 mg every 8 hours, and baclofen 10 mg every 8 

hours medications for pain with good relief.  Patient denies adverse drug 

effects from medications.  Today, pt denies new-onset weakness, bowel/bladder 

incontinence, or any other signs or symptoms of cauda equina syndrome. There 

are no signs of acute intoxication, and no indications of medication diversion 

or overuse.





In addition to above, 13-point review of systems is also negative for chest pain

, shortness of breath, changes in vision, changes in hearing, new onset weakness

, abdominal pain, diarrhea, extreme fatigue, malaise, fever, skin changes, 

homicidal or suicidal ideation, or bowel or bladder incontinence.





Vital Signs:  Reviewed in EMR


Gen:  WDWN, AAOx3, NAD


HEENT:  NCAT, EOMI, hearing grossly normal, TTP over occipital ridges


Pulm:  resp unlabored


Abd:  soft, NT, Normal


Neck:  supple, trachea midline


ROM in flexion cervical spine:  reduced


ROM in extension cervical spine: reduced  


Cervical paravertebral tenderness: +  


Cervical Facet tenderness:  ++, L >> R


Spurling's:  + LUE


Upper extremity:  decreased  strength secondary to pain





Neuro:  CN II-XII grossly intact, muscle strength lower extremities PRESERVED





Imaging:  Reviewed in EMR


Assessment:


1. cervical spondylosis without myelopathy


2. cervical degenerative disc disease 


3. chronic pain syndrome





Plan:


1. Explanation:  Opioid and psychological risk scores were reviewed.  Diagnoses

, prognoses, and multiple treatment options including but not limited to 

physical therapy, interventional therapies, adjuvant medical therapies, 

narcotic medication therapies, and surgery were discussed with the patient and 

all questions were answered to the patient's satisfaction.


2. Opioid agreement: Patient has previously signed narcotic agreement, and was 

orally counseled to not overuse, abuse, divert, or cell medications, and to 

take them as prescribed by only 1 healthcare provider.  The patient was also 

counseled to store opioid medications in a safe and preferably locked location.

  Patient was also counseled against driving or operating heavy equipment while 

using narcotic medications and also to not use alcohol or any illicit or 

recreational drugs.  The patient verbalized understanding that lack of 

compliance with any of the above and likely result in failure to renew narcotic 

prescriptions, possible discharge from the clinic, and possible legal 

ramifications thereafter if indicated.


3. Counseling:  The patient was counseled extensively on SMOKING CESSATION, 

BODY MASS INDEX, EXERCISE.  Specifically, the patient was instructed regarding 

the importance of smoking cessation, obesity, and exercise in the context of 

both chronic pain and overall health.


4. Procedures:  none for now


5. Consultations:  None


6. Investigations:  None


7. Medications:  Norco 7.5 #60 for two months' supply, Motrin 800 mg every 8 

hours dispense 90 with 1 refill, baclofen 10 mg #90 with two refills





8. Disposition:  f/u for re-eval 8 weeks.  





PQRS measures:


1-Patient's medications are documented in the chart.


2-Tobacco use is positive, counseling given


3-Patient has not had a pneumococcal vaccine.


4-Advanced care planning discussed, patient unable to give.


5-Opioid contract signed with the patient .


6-Pain positive, follow-up visit or procedure scheduled


7-Patient's blood pressure measured and documented, 113/70, and she will follow 

up with her primary care


8-Patient's weight was measured, and body mass index ABOVE the normal limits,  

( 33 , )and counseling was done.  Patient instructed to follow up with PCP.


9-Patient WAS NOT identified as an unhealthy alcohol user.








Objective





- Vital Signs


Vital signs: 


 Vital Signs











Temp      


 


Pulse  85   11/14/18 14:36


 


Resp  16   11/14/18 14:36


 


BP  113/78   11/14/18 14:36


 


Pulse Ox  97   11/14/18 14:36

## 2019-01-10 NOTE — P.PN
Subjective


Progress Note Date: 01/09/19





Patient returns for followup for chronic neck pain and headaches.  we have done 

bilateral cervical medial branch RFA .  Patient also had some right shoulder 

pain , and mid back pain and lower cervical spine pain Patient continues on 

Norco and Motrin 800 mg every 8 hours, and baclofen 10 mg every 8 hours 

medications for pain with good relief.  Patient denies adverse drug effects 

from medications.  Today, pt denies new-onset weakness, bowel/bladder 

incontinence, or any other signs or symptoms of cauda equina syndrome. There 

are no signs of acute intoxication, and no indications of medication diversion 

or overuse.





In addition to above, 13-point review of systems is also negative for chest pain

, shortness of breath, changes in vision, changes in hearing, new onset weakness

, abdominal pain, diarrhea, extreme fatigue, malaise, fever, skin changes, 

homicidal or suicidal ideation, or bowel or bladder incontinence.





Vital Signs:  Reviewed in EMR


Gen:  WDWN, AAOx3, NAD


HEENT:  NCAT, EOMI, hearing grossly normal, TTP over occipital ridges


Pulm:  resp unlabored


Abd:  soft, NT, Normal


Neck:  supple, trachea midline


ROM in flexion cervical spine:  reduced


ROM in extension cervical spine: reduced  


Cervical paravertebral tenderness: +  


Cervical Facet tenderness:  ++, L >> R


Spurling's:  + LUE


Upper extremity:  decreased  strength secondary to pain





Neuro:  CN II-XII grossly intact, muscle strength lower extremities PRESERVED





Imaging:  Reviewed in EMR


Assessment:


1. cervical spondylosis without myelopathy


2. cervical degenerative disc disease 


3. chronic pain syndrome .


4-myofascial pain lower cervical and upper thoracic area





Plan:


1. Explanation:  Opioid and psychological risk scores were reviewed.  Diagnoses

, prognoses, and multiple treatment options including but not limited to 

physical therapy, interventional therapies, adjuvant medical therapies, 

narcotic medication therapies, and surgery were discussed with the patient and 

all questions were answered to the patient's satisfaction.


2. Opioid agreement: Patient has previously signed narcotic agreement, and was 

orally counseled to not overuse, abuse, divert, or cell medications, and to 

take them as prescribed by only 1 healthcare provider.  The patient was also 

counseled to store opioid medications in a safe and preferably locked location.

  Patient was also counseled against driving or operating heavy equipment while 

using narcotic medications and also to not use alcohol or any illicit or 

recreational drugs.  The patient verbalized understanding that lack of 

compliance with any of the above and likely result in failure to renew narcotic 

prescriptions, possible discharge from the clinic, and possible legal 

ramifications thereafter if indicated.


3. Counseling:  The patient was counseled extensively on SMOKING CESSATION, 

BODY MASS INDEX, EXERCISE.  Specifically, the patient was instructed regarding 

the importance of smoking cessation, obesity, and exercise in the context of 

both chronic pain and overall health.


4. Procedures:  none for now


5. Consultations:  None


6. Investigations:  None


7. Medications:  Norco 7.5 #60 for two months' supply, Motrin 800 mg every 8 

hours dispense 90 with 1 refill, baclofen 10 mg #90 with two refills





8. Disposition:  f/u for re-eval 8 weeks.  





PQRS measures:


1-Patient's medications are documented in the chart.


2-Tobacco use is positive, counseling given


3-Patient has not had a pneumococcal vaccine.


4-Advanced care planning discussed, patient unable to give.


5-Opioid contract signed with the patient .


6-Pain positive, follow-up visit or procedure scheduled


7-Patient's blood pressure measured and documented, 118/78, and she will follow 

up with her primary care


8-Patient's weight was measured, and body mass index ABOVE the normal limits,  

( 32.7 , )and counseling was done.  Patient instructed to follow up with PCP.


9-Patient WAS NOT identified as an unhealthy alcohol user.











Objective





- Vital Signs


Vital signs: 


 Vital Signs











Temp      


 


Pulse  98   01/09/19 14:37


 


Resp  16   01/09/19 14:37


 


BP  118/78   01/09/19 14:37


 


Pulse Ox  98   01/09/19 14:37








 Intake & Output











 01/09/19 01/10/19 01/10/19





 18:59 06:59 18:59


 


Weight 97.522 kg

## 2019-03-06 NOTE — P.PAINPG
Subjective


Progress Note Date: 03/06/19


This is a follow-up visit for this 41 years old female with a chronic history 

of severe neck pain, she is diagnosed with cervical spondylosis and cervical 

degenerative disc disease, previously would have been radiofrequency ablation 

of the medial branch cervical area, rib on the right side C2/C3/C4 RFA in July 2018, and we have done the left side in August 2018, she did fairly well until 

recently, over the last few weeks she started complaining of severe neck pain 

and that with radiation to the right upper extremity associated with severe 

numbness and tingling sensation, the numbness radiated up to the distal aspect 

of her third fourth and fifth fingers in her right hand , the symptoms is 

continuous interfering with her functions and quality of life, and the current 

medication is not helping to control the symptoms, he denies any fever or night 

sweats she denies any motor or sensory deficit, she continued to have episode 

of headache, she is currently on furosemide, Norco 7.5/325 twice a day, 

baclofen 10 mg 3 times a day and Mobic 7.5 mg daily, she denies any side effect 

of the medication she denies any excessive drowsiness or sleepiness, she denies 

any suicidal ideation.





Objective





- Vital Signs


Vital signs: 


 Vital Signs











Temp      


 


Pulse  86   03/06/19 14:26


 


Resp  18   03/06/19 14:26


 


BP  124/74   03/06/19 14:26


 


Pulse Ox  98   03/06/19 14:26








 Intake & Output











 03/05/19 03/06/19 03/06/19





 18:59 06:59 18:59


 


Weight   97.522 kg














- Exam


    


   Physical Examinations  :


    -Constitutiona         : Cooperative , not in acute distress .


    -HEENT                   :  nech ;  supple ,  no Lymphadenopathy  , normal  

thyroid  size .


                                           eyes  :  no ptosis , no icterus,  no 

photophobia .  


                                           ENT  : normal   of hearing  , normal

  oropharynx     , no Thrush .  


    - Respiratory         : Chest clear to auscultations Bilaterally  ,  no 

wheezing   , no Rhonchi   .  


    - Cardiovascula   : regular rate and rhythem , S1 ,  S2  ,   no  S3 ,  no  

S4.


    - Gastrointestina   :  abdomen soft  no tenderness , bowel sounds  , no 

organomegally  .


    - Genitourinary      :   Defferred .                                       

                                                                               

                                                                               

                                                                               

                                                                               

                 


    - neurologic           :   Cranial nerve II   to  XII  intact ,  no   focal 

neurological deffecit  .


    -psychatric             : alert ,  oriented  X 3  ,   appropriate affect   

, intact judgment  and insight  .  


    -Lymphatic             :    no Lymphadenopathy .


   - musculoskeltal   :     


                                         Cervical Spine 


                                         motor  stregnth in the deltoid and 

biceps,     normal   right side    ,  normal  Left side


                                         motor  stregnth biceps and the wrist 

extensors   normal right side ,normal left side .


                                         motor stregnth in the triceps muscle . 

normal  Right side , normal  Left side  


                                         deep tendon reflexes  normal   at the 

biceps ,   normal  at Brachioradialis , normal  at triceps.


                                         positive cervical facet loading test .


                                         Spurling test positive bilaterally.


                                         Neck distraction test positive 

bilaterally.


                                         Olinda sign positive bilaterally.


                                         Lumber spine


                                         moter stegnth lower extremities ,thigh 

and legs  5/5 Right side ,  5/5  Left side 





Assessment and Plan


Plan: 


Assessment and plan= cervical radiculopathy ,   cervical spondylosis with 

cervical facet arthropathy without myelopathy


                                We will order MRI of the cervical spine without 

contrast to evaluate if there is any new etiology causing her symptoms


                                Patient most likely she'll need cervical 

epidural steroid injections, and she will be scheduled for that after we get 

the MRI report


                                Prescription refill for her medications Fioricet

/Norco/baclofen/Mobic given for 2 months


Time with Patient: Less than 30





PQRS Measure Charge Sheet


Measure #130: Documentation of Current Meds in Medical Chart: Patient's 

medications documented in chart


Measure #226: Tobacco Use: Screen & Cessation Intervention: Pt screened for 

tobacco use AND intervention given


Measure #111: Pneumonia Vaccination: Pneumococcal vaccine NOT administered or 

previously given


Measure #47: Advance Care Plan: Advance care planning discussed & documented, 

pt chose/unable to give


Measure #412: Opioid Treatment Agreement: Documented signed opioid trtmnt 

agreemnt min once during opioid trtmnt


Measure #408: Opioid Therapy Follow-up Evaluation: Patient had f/u eval minimum 

every 3 months during opioid therapy


Measure #317: Preventitive Care & Scrn High Bld Press & F/U: Normal blood 

pressure, f/u not required


Measure #128: Body Mass Index (BMI) Screening & Follow-up: BMI documented ABOVE 

normal parameters - f/u documented


Measure #131: Pain Assessment & Follow-up: Pain positive & plan documented, 

Follow-up scheduled


Measure #431: Unhealthy Alcohol Use Preventative Care & Scrn: Patient not 

identified as an unhealthy alcohol user


PQRS Narrative: 


 





Smoking Status                   Current every day smoker


Do You Want the Pneumonia        No


Vaccine AT THIS TIME?            


Narcotic Agreement Date Signed   03/08/18


Blood Pressure                   124/74


Pain Intensity [Right Shoulder   9


]                                


Scale Used                       Numeric (1 - 10)


Hx Alcohol Use ([facility])              No








Home Medications: 


Ambulatory Orders





Thyroid,Pork [[place] Thyroid] 180 mg PO DAILY 06/11/14 


Omega-3 Fatty Acids [Omega-3] 1,000 mg PO DAILY 02/10/17 


Citalopram Hydrobromide [CeleXA] 20 mg PO DAILY 05/02/17 


Baclofen [Lioresal] 10 mg PO TID PRN #60 tablet 03/06/19 


Buta/APAP/Caf/Cod -33-30 [Fioricet w/Cod -38-30MG] 1 each PO Q6HR 

PRN #30 cap 03/06/19 


HYDROcodone/APAP 7.5-325MG [Norco 7.5-325] 1 tab PO Q12H PRN 30 Days #60 tab 03/ 06/19 


HYDROcodone/APAP 7.5-325MG [Norco 7.5-325] 1 tab PO Q12HR PRN #60 tab 03/06/19 


Meloxicam [Mobic] 7.5 mg PO DAILY PRN #30 tab 03/06/19 


Rosuvastatin Calcium [Crestor] 5 mg PO DAILY 03/06/19 











Controlled Substance Measures





- Controlled Substance Measures


Is patient prescribed a controlled substance at discharge?: Yes


When asked, does pt state using other controlled substances?: No


If prescribed controlled substance>3 days was MAPS reviewed?: Yes


If Rx opioid, was Start Talking consent form obtained?: Yes


If opioid is for acute pain is fill amount 7 days or less?: No


Was information provided regarding opioid addiction?: Yes

## 2019-04-11 NOTE — FL
EXAMINATION TYPE: FL guided pain mgmt statistic

 

DATE OF EXAM: 4/11/2019

 

HISTORY: Flouroscopy  time

 

5 seconds of fluoroscopy provided. 

 

IMPRESSION:

1. Fluoroscopy time.

## 2019-04-11 NOTE — P.PCN
Date of Procedure: 04/11/19


Procedure(s) Performed: 


.





PROCEDURE


1. Cervical epidural steroid injection under fluoroscopic guidance, C7-T1  


2. Cervical epidurogram.  





PREOPERATIVE DIAGNOSIS: 1- Cervical Degenerative Disc Diseases     2- Cervical 

radiculopathy.,  3-cervical spondylosis with cervical Facet arthropathy without 

myelopathy 





POSTOPERATIVE DIAGNOSIS: : 1- Cervical Degenerative Disc Diseases , 2- Cervical 

radiculopathy.   3-,cervical spondylosis with cervical Facet arthropathy without

myelopathy





ANESTHESIA: Local anesthesia with lidocaine 1 % , and moderate sedation, with 

Versed 3 mg and Fentanyl 100  mcg.





EBL 0





PROCEDURE INDICATION: The patient with neck pain and radiculitis unresponsive to

conservative treatment consents for procedure. 





PROCEDURE DESCRIPTION / TECHNIQUE: The patient was seen and identified in the 

preoperative area. Risks, benefits, complications, including but not limited to 

infections ,bleeding , allergic reactions to the medications ,and not complete 

pain releife,  and alternatives were discussed with the patient, the patient 

agreed to proceed with the procedure and signed the consent.


 


Patient was taken to the OR and time out was completed.


The patient was placed in the prone position on the procedure table. A pillow 

was placed under the patients chest to increase the cervical interlaminar 

space. The cervical area was prepped and draped in the usual sterile fashion. 

Vital signs were closely monitored during the procedure. Conscious sedation was 

used during the procedure to decrease patients anxiety. 


Using anterior-posterior fluoroscopy, the C7-T1 interlaminar space was 

identified and the skin over this site was marked and then infiltrated with 1% 

lidocaine subcutaneously. Subsequently, a 20-gauge 3-1/2-inch Tuohy epidural 

needle was inserted and advanced toward the epidural space by means of the 

``hanging-drop technique and guided by AP and lateral fluoroscopy. The correct

needle position in the epidural space was verified with the injection of 2  mL 

of the water soluble contrast dye [id number]-[id number]  and observing an excellent 

epidurogram with the epidural spread of the dye, after negative aspiration for 

blood and CSF and in the absence of paresthesias. Again after negative 

aspiration,  mixture containing 20 mg Dexamethasone  and  2 ml of preservative-

free normal saline  injected and a washout of epidurogram was seen. Needle was 

withdrawn intact, skin was cleansed, and bandages were applied. 


Complications= none.


Disposition= patient was placed in supine position and transferred to the 

recovery room area in stable condition and there was no evidence of upper or 

lower extremity motor or sensory deficit after the procedure patient was 

discharged from recovery room after discharge criteria met and home discharge 

instructions was given by the staff and patient will follow with the pain clinic

in 2-4 weeks
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
## 2019-04-25 NOTE — P.PCN
Date of Procedure: 04/25/19


Procedure(s) Performed: 





1. Cervical epidural steroid injection under fluoroscopic guidance, C7-T1  


2. Cervical epidurogram.  





PREOPERATIVE DIAGNOSIS: 1- Cervical herniated Disc Diseases     2- Cervical 

radiculopathy.,  3-cervical spondylosis with cervical Facet arthropathy without 

myelopathy 





POSTOPERATIVE DIAGNOSIS: : 1- Cervical herniated Disc Diseases , 2- Cervical 

radiculopathy.   3-,cervical spondylosis with cervical Facet arthropathy without

myelopathy





ANESTHESIA: Local anesthesia with lidocaine 1 % , and moderate sedation, with 

Versed 3 mg and Fentanyl 100  mcg.





EBL 0





PROCEDURE INDICATION: The patient with neck pain and radiculitis unresponsive to

conservative treatment consents for procedure. 





PROCEDURE DESCRIPTION / TECHNIQUE: The patient was seen and identified in the 

preoperative area. Risks, benefits, complications, including but not limited to 

infections ,bleeding , allergic reactions to the medications ,and not complete 

pain releife,  and alternatives were discussed with the patient, the patient 

agreed to proceed with the procedure and signed the consent.


 


Patient was taken to the OR and time out was completed.


The patient was placed in the prone position on the procedure table. A pillow 

was placed under the patients chest to increase the cervical interlaminar spa

ce. The cervical area was prepped and draped in the usual sterile fashion. Vital

signs were closely monitored during the procedure. Conscious sedation was used 

during the procedure to decrease patients anxiety. 


Using anterior-posterior fluoroscopy, the C7-T1 interlaminar space was 

identified and the skin over this site was marked and then infiltrated with 1% 

lidocaine subcutaneously. Subsequently, a 20-gauge 3-1/2-inch Tuohy epidural 

needle was inserted and advanced toward the epidural space by means of the 

``hanging-drop technique and guided by AP and lateral fluoroscopy. The correct

needle position in the epidural space was verified with the injection of 2  mL 

of the water soluble contrast dye Isovue-200  and observing an excellent 

epidurogram with the epidural spread of the dye, after negative aspiration for 

blood and CSF and in the absence of paresthesias. Again after negative 

aspiration,  mixture containing 20 mg Dexamethasone  and  2 ml of preservative-

free normal saline  injected and a washout of epidurogram was seen. Needle was 

withdrawn intact, skin was cleansed, and bandages were applied. 


Complications= none.


Disposition= patient was placed in supine position and transferred to the 

recovery room area in stable condition and there was no evidence of upper or 

lower extremity motor or sensory deficit after the procedure patient was 

discharged from recovery room after discharge criteria met and home discharge 

instructions was given by the staff and patient will follow with the pain clinic

in 2-4 weeks

## 2019-04-25 NOTE — FL
EXAMINATION TYPE: FL guided pain mgmt statistic

 

DATE OF EXAM: 4/25/2019

 

FLUOROSCOPY

 

Fluoroscopy time of 4 seconds was used during cervical epidural steroid injection.  No image/s docume
nt/s the procedure.

## 2019-04-30 NOTE — P.PAINPG
Subjective


Progress Note Date: 04/30/19





This is a follow-up visit for this 41 years old female with a chronic history of

severe neck pain, she is diagnosed with cervical spondylosis and cervical 

herniated disc disease, currently she  complaining of severe neck pain and that 

with radiation to the right upper extremity associated with severe numbness and 

tingling sensation, and weakness in the right upper extremity ,the numbness 

radiated up to the distal aspect of her third fourth and fifth fingers in her 

right hand , the symptoms is continuous interfering with her functions and 

quality of life, and the current medication is not helping to control the 

symptoms, she denies any fever or night sweats , she continued to have episode 

of headache, she is currently on Norco 7.5/325 twice a day, baclofen 10 mg 3 

times a day and Mobic 7.5 mg daily, Neurontin 300 mg 3 times a day, Fioricet, 

she denies any side effect of the medication she denies any excessive drowsiness

or sleepiness, she denies any suicidal ideation, patient continued to have 

severe neck pain and numbness in the right upper extremity after 2 cervical 

epidural steroid injection, she reported that she had no benefit from the 

injections.





- Exam


    


   Physical Examinations  :


    -Constitutiona         : Cooperative , not in acute distress .


    -HEENT                   :  nech ;  supple ,  no Lymphadenopathy  , normal  

thyroid  size .


                                           eyes  :  no ptosis , no icterus,  no 

photophobia .  


                                           ENT  : normal   of hearing  , normal 

oropharynx     , no Thrush .  


    - Respiratory         : Chest clear to auscultations Bilaterally  ,  no 

wheezing   , no Rhonchi   .  


    - Cardiovascula   : regular rate and rhythem , S1 ,  S2  ,   no  S3 ,  no  

S4.


    - Gastrointestina   :  abdomen soft  no tenderness , bowel sounds  , no 

organomegally  .


    - Genitourinary      :   Defferred .                                        

                                                                                

                                                                                

                                                                                

                                                                                

       


    - neurologic           :   Cranial nerve II   to  XII  intact ,  no   focal 

neurological deffecit  .


    -psychatric             : alert ,  oriented  X 3  ,   appropriate affect   ,

intact judgment  and insight  .  


    -Lymphatic             :    no Lymphadenopathy .


   - musculoskeltal   :     


                                         Cervical Spine 


                                         motor  stregnth in the deltoid and 

biceps,   3/5  right side    ,  normal  Left side


                                         motor  stregnth biceps and the wrist 

extensors   3/5  right side ,normal left side .


                                         motor stregnth in the triceps muscle . 

4/5 Right side , normal  Left side  


                                         deep tendon reflexes  decreased at the 

right  biceps ,   decreased   at right Brachioradialis , normal  at triceps.


                                         positive cervical facet loading test .


                                         Spurling test negative bilaterally.


                                         Neck distraction test positive 

bilaterally.


                                         Olinda sign positive bilaterally.


                                         Lumber spine


                                         moter stegnth lower extremities ,thigh 

and legs  5/5 Right side ,  5/5  Left side 





Assessment and Plan


Plan: 


Assessment and plan= cervical radiculopathy ,   cervical spondylosis with 

cervical facet arthropathy without myelopathy, cervical herniated disc disease


                              Patient continued to have severe neck pain with 

right upper extremity numbness and currently patient had progressive weakness in

the right upper extremity, she was referred to neurosurgeon for evaluation for 

possible surgical intervention and possible decompression, patient already had 

an appointment with the neurosurgeon in the next few days, meanwhile I recommend

to increase the Neurontin to 400 mg 3 times a day, discontinue Norco, start 

patient on Percocet 10/325 by mouth 6 hours dispense 90 with 1 refill, continue 

baclofen 10 3 times a day, continue Mobic 7.5 mg daily, patient will follow up 

with the pain clinic after she sees the neurosurgeon, patient could benefit from

decrease workload, and also she could benefit from physical therapy


Time with Patient: Less than 30








Objective





- Vital Signs


Vital signs: 


                                   Vital Signs











Temp      


 


Pulse  96   04/30/19 11:36


 


Resp  20   04/30/19 11:36


 


BP  137/93   04/30/19 11:36


 


Pulse Ox  98   04/30/19 11:36








                                 Intake & Output











 04/29/19 04/30/19 04/30/19





 18:59 06:59 18:59


 


Weight   98.43 kg














PQRS Measure Charge Sheet


Measure #130: Documentation of Current Meds in Medical Chart: Patient's 

medications documented in chart


Measure #226: Tobacco Use: Screen & Cessation Intervention: Pt screened for 

tobacco use AND intervention given


Measure #111: Pneumonia Vaccination: Pneumococcal vaccine NOT administered or 

previously given


Measure #47: Advance Care Plan: Advance care planning discussed & documented, pt

chose/unable to give


Measure #412: Opioid Treatment Agreement: Documented signed opioid trtmnt 

agreemnt min once during opioid trtmnt


Measure #408: Opioid Therapy Follow-up Evaluation: Patient had f/u eval minimum 

every 3 months during opioid therapy


Measure #317: Preventitive Care & Scrn High Bld Press & F/U: Normal blood 

pressure, f/u not required


Measure #128: Body Mass Index (BMI) Screening & Follow-up: BMI documented ABOVE 

normal parameters - f/u documented


Measure #131: Pain Assessment & Follow-up: Pain positive & plan documented, 

Follow-up scheduled


Measure #431: Unhealthy Alcohol Use Preventative Care & Scrn: Patient not 

identified as an unhealthy alcohol user


PQRS Narrative: 


                                        





Smoking Status                   Current every day smoker


Do You Want the Pneumonia        No


Vaccine AT THIS TIME?            


Narcotic Agreement Date Signed   03/08/18


Blood Pressure                   137/93


Pain Intensity [Right Shoulder   10


]                                


Scale Used                       Numeric (1 - 10)


Hx Alcohol Use (MH)              No








Home Medications: 


Ambulatory Orders





Thyroid,Pork [[place] Thyroid] 180 mg PO DAILY 06/11/14 


Omega-3 Fatty Acids [Omega-3] 1,000 mg PO DAILY 02/10/17 


Citalopram Hydrobromide [CeleXA] 20 mg PO DAILY 05/02/17 


Baclofen [Lioresal] 10 mg PO TID PRN #60 tablet 03/06/19 


Buta/APAP/Caf/Cod -61-30 [Fioricet w/Cod -87-30MG] 1 each PO Q6HR 

PRN #30 cap 03/06/19 


HYDROcodone/APAP 7.5-325MG [Norco 7.5-325] 1 tab PO Q12H PRN 30 Days #60 tab 

03/06/19 


Rosuvastatin Calcium [Crestor] 5 mg PO DAILY 03/06/19 


Gabapentin [Neurontin] 400 mg PO TID 04/30/19 


Meloxicam [Mobic] 1 tab PO DAILY 04/30/19 


oxyCODONE-APAP 10-325MG [Percocet  mg] 1 tab PO Q6HR PRN 04/30/19 











Controlled Substance Measures





- Controlled Substance Measures


Is patient prescribed a controlled substance at discharge?: Yes


When asked, does pt state using other controlled substances?: No


If prescribed controlled substance>3 days was MAPS reviewed?: Yes


If Rx opioid, was Start Talking consent form obtained?: Yes


If opioid is for acute pain is fill amount 7 days or less?: No


Was information provided regarding opioid addiction?: Yes

## 2019-08-22 NOTE — P.PN
Subjective


Progress Note Date: 08/22/19





This is a follow-up visit for this 41 years old female with a chronic history of

severe neck pain, she is diagnosed with cervical spondylosis and cervical 

herniated disc disease, few weeks ago patient had posterior cervical 

decompression, and she reported that her symptoms improved significantly, she 

denies any fever or night sweats , she continued to have episode of headache, 

she is currently on Norco  7.5/325 every 8 hours , Robaxin 500 mg mg 3 times a 

day , Neurontin 400 mg 3 times a day, she denies any side effect of the 

medication she denies any excessive drowsiness or sleepiness, she denies any 

suicidal ideation, patient wishes to decrease her pain medication, she continued

to have some numbness in her right hand








   Physical Examinations  :


    -Constitutiona         : Cooperative , not in acute distress .


    -HEENT                   :  nech ;  supple ,  no Lymphadenopathy  , normal  

thyroid  size .


                                           eyes  :  no ptosis , no icterus,  no 

photophobia .  


                                           ENT  : normal   of hearing  , normal 

oropharynx     , no Thrush .  


    - Respiratory         : Chest clear to auscultations Bilaterally  ,  no 

wheezing   , no Rhonchi   .  


    - Cardiovascula   : regular rate and rhythem , S1 ,  S2  ,   no  S3 ,  no  

S4.


    - Gastrointestina   :  abdomen soft  no tenderness , bowel sounds  , no 

organomegally  .


    - Genitourinary      :   Defferred .                                        

                                                                                

                                                                                

                                                                                

                                                                                

       


    - neurologic           :   Cranial nerve II   to  XII  intact ,  no   focal 

neurological deffecit  .


    -psychatric             : alert ,  oriented  X 3  ,   appropriate affect   ,

intact judgment  and insight  .  


    -Lymphatic             :    no Lymphadenopathy .


   - musculoskeltal   :     


                                         Cervical Spine 


                                         motor  stregnth in the deltoid and 

biceps,   4/5  right side    ,  normal  Left side


                               


                                         Lumber spine


                                         moter stegnth lower extremities ,thigh 

and legs  5/5 Right side ,  5/5  Left side 





Assessment and plan= cervical radiculopathy ,   cervical spondylosis with 

cervical facet arthropathy without myelopathy, cervical herniated disc disease


                               Status post posterior cervical decompression , 

the symptoms improved after the surgery


                               She continued to have some numbness in her right 

hand 


                                Patient will be referred to orthopedic surgeon 

for evaluation for possible carpal tunnel surgery


                                Prescription refill for her medication Robaxin 

500 mg every 8 hours Neurontin 400 mg 3 times a day Norco 7.5 mg/325 every 8 

hours dispense 90 with 1 refill and Mobic 7.5 mg daily dispense 30 with one 

refill














PQRS Measure Charge Sheet


Measure #130: Documentation of Current Meds in Medical Chart: Patient's 

medications documented in chart


Measure #226: Tobacco Use: Screen & Cessation Intervention: Pt screened for 

tobacco use AND intervention given


Measure #111: Pneumonia Vaccination: Pneumococcal vaccine NOT administered or 

previously given


Measure #47: Advance Care Plan: Advance care planning discussed & documented, pt

chose/unable to give


Measure #412: Opioid Treatment Agreement: Documented signed opioid trtmnt 

agreemnt min once during opioid trtmnt


Measure #408: Opioid Therapy Follow-up Evaluation: Patient had f/u eval minimum 

every 3 months during opioid therapy


Measure #317: Preventitive Care & Scrn High Bld Press & F/U: Normal blood 

pressure, f/u not required


Measure #128: Body Mass Index (BMI) Screening & Follow-up: BMI documented ABOVE 

normal parameters - f/u documented


Measure #131: Pain Assessment & Follow-up: Pain positive & plan documented, 

Follow-up scheduled


Measure #431: Unhealthy Alcohol Use Preventative Care & Scrn: Patient not 

identified as an unhealthy alcohol user


PQRS Narrative: 


                                        





- Controlled Substance Measures


Is patient prescribed a controlled substance at discharge?: Yes


When asked, does pt state using other controlled substances?: No


If prescribed controlled substance>3 days was MAPS reviewed?: Yes


If Rx opioid, was Start Talking consent form obtained?: Yes


If opioid is for acute pain is fill amount 7 days or less?: No


Was information provided regarding opioid addiction?: Yes








Objective





- Vital Signs


Vital signs: 


                                   Vital Signs











Temp      


 


Pulse      


 


Resp  18   08/22/19 10:44


 


BP  115/74   08/22/19 10:44


 


Pulse Ox  98   08/22/19 10:44








                                 Intake & Output











 08/21/19 08/22/19 08/22/19





 18:59 06:59 18:59


 


Weight   94.801 kg

## 2019-09-05 NOTE — MM
Reason for exam: screening  (asymptomatic).

Last mammogram was performed 1 year and 10 months ago.



History:

Took hormonal contraceptives for 1 year.



Physical Findings:

A clinical breast exam by your physician is recommended on an annual basis and 

results should be correlated with mammographic findings.



MG 3D Screening Mammo W/Cad

Bilateral CC and MLO view(s) were taken.

Prior study comparison: November 4, 2017, bilateral MG screening mammo w CAD.  

March 8, 2013, bilateral digital screening mammo w/CAD.

There are scattered fibroglandular densities.  There is no discrete abnormality.  

No significant changes when compared with prior studies.





ASSESSMENT: Negative, BI-RAD 1



RECOMMENDATION:

Routine screening mammogram of both breasts in 1 year.

## 2019-10-20 NOTE — P.PAINPG
Subjective


Progress Note Date: 10/17/19


This is a follow-up visit for this 42 years old female with a chronic history of

severe neck pain, she is diagnosed with cervical spondylosis and cervical 

herniated disc disease, Status post  posterior cervical decompression, and she 

reported that her symptoms improved significantly, she denies any fever or night

sweats , she continued to have episode of headache, he is complaining of a lot 

of muscle spasm in the cervical and suprascapular area, she is currently on 

Norco  7.5/325 every 8 hours , Robaxin 500 mg mg 3 times a day , Neurontin 400 

mg 3 times a day, she denies any side effect of the medication she denies any 

excessive drowsiness or sleepiness, she denies any suicidal ideation, patient wi

shes to decrease her pain medication, she continued to have some numbness in her

right hand








Objective





- Vital Signs


Vital signs: 


                                   Vital Signs











Temp      


 


Pulse  85   10/17/19 13:18


 


Resp  18   10/17/19 13:18


 


BP  107/69   10/17/19 13:18


 


Pulse Ox  97   10/17/19 13:18














- Exam


    


   Physical Examinations  :


    -Constitutiona       : Cooperative , not in acute distress .


    -HEENT                :  nech :  supple ,  no Lymphadenopathy  , normal  

thyroid  size .


                                  eyes  :  no ptosis , no icterus,  no 

photophobia .  


                                  ENT  : normal   of hearing  , normal  

oropharynx     , no Thrush .  


    - Respiratory        : Chest clear to auscultations Bilaterally  ,  no 

wheezing   , no Rhonchi   .  


    - Cardiovascula    : regular rate and rhythem , S1 ,  S2  ,   no  S3 ,  no  

S4.


    - Gastrointestina   :  abdomen soft  no tenderness , bowel sounds  , no 

organomegally  .


    - Genitourinary     :   Defferred .                                         

                                                                                

                                                                                

                                                                                

                                                                                

      


    - neurologic         :   Cranial nerve II   to  XII  intact ,  no   focal 

neurological deffecit  .


    -psychatric          : alert ,  oriented  X 3  ,   appropriate affect   , 

intact judgment  and insight  .  


    -Lymphatic          :    no Lymphadenopathy .


   - musculoskeltal    :     


                                 Cervical Spine 


                                         motor  stregnth in the deltoid and 

biceps,   normal   right side    ,  normal  Left side


                                         motor  stregnth biceps and the wrist 

extensors   normal right side ,normal left side .


                                         motor stregnth in the triceps muscle . 

normal  Right side , normal  Left side  


                                         deep tendon reflexes  normal   at the 

biceps ,   normal  at Brachioradialis , normal  at triceps.


                                         cervical facet loading test: Positive 

Bilaterally


                                         Spurling test positive bilaterally.


                                         Neck distraction test positive 

bilaterally.


                                         Olinda sign positive bilaterally.


                                         Durable trigger point identified in the

cervical paravertebral muscles bilaterally the right and left trapezius and 

suprascapular


                                   Lumber spine


                                         moter stegnth lower extremities ,thigh 

and legs  5/5 Right side ,  5/5  Left side


                                        Multiple trigger point identified in the

right side lumbar paravertebral muscles 





Assessment and Plan


Plan: 


Assessment and plan=


   chronic low back pain secondary to cervical herniated disc disease  , 

cervical spondylosis with lumbar facet arthropathy .


   Myofascial pain syndrome cervical trapezius suprascapular and right lumbar


   chronic and current  use of high-risk medication (opioids)


   Patient denies any side effects of the current pain medication  and the 

current treatment/medication helping the  patient to do activity of daily living

,


   Diagnoses, prognosis, treatment options, including but not limited to 

physical therapy, medication management, interventional therapies, and surgery, 

were discussed with the patient


   All the questions answered


   The narcotic consent was signed and patient agreed and understood the side 

effects and complications of opioid treatment.


   Patient signed the narcotic agreement, and  was orally counseled, not to 

overuse, not to abuse, not to Divert , not tp sell pain medication, and to take 

it as prescribed only,


   Patient was counseled not to drive or operate heavy equipment while using 

narcotic medication, and advised not to use alcohol or any Illicit  drugs while 

using the narcotis.


   understanding that lack of compliance with any of the above instructions, 

will likely to cause discharge from, the pain service, not to renew his narcotic

prescriptions


   MAPS Reviwed and it was apropriate .


   Medication managements= patient will be given prescription refills for Norco 

7.5/325 every 8 hours dispense 90 with 1 refill Neurontin 400 mg 3 times a day 

dispense 90 with 1 refill 


   Robaxin 500 mg 3 times a day dispense 90 with 1 refill  ,


   Interventions= patient could benefit from trigger point injection right and 

left cervical and trapezius and suprascapular area and the right lumbar 

paravertebral muscles


               , 





PQRS Measure Charge Sheet


Measure #130: Documentation of Current Meds in Medical Chart: Patient's med

ications documented in chart


Measure #226: Tobacco Use: Screen & Cessation Intervention: Pt screened for 

tobacco use AND intervention given


Measure #111: Pneumonia Vaccination: Pneumococcal vaccine NOT administered or 

previously given


Measure #47: Advance Care Plan: Advance care planning discussed & documented, pt

chose/unable to give


Measure #412: Opioid Treatment Agreement: Documented signed opioid trtmnt 

agreemnt min once during opioid trtmnt


Measure #408: Opioid Therapy Follow-up Evaluation: Patient had f/u eval minimum 

every 3 months during opioid therapy


Measure #317: Preventitive Care & Scrn High Bld Press & F/U: Normal blood 

pressure, f/u not required


Measure #128: Body Mass Index (BMI) Screening & Follow-up: BMI documented ABOVE 

normal parameters - f/u documented


Measure #131: Pain Assessment & Follow-up: Pain positive & plan documented, 

Follow-up scheduled


Measure #431: Unhealthy Alcohol Use Preventative Care & Scrn: Patient not 

identified as an unhealthy alcohol user


PQRS Narrative: 


                                        





Smoking Status                   Current every day smoker


Narcotic Agreement Date Signed   03/08/18


Blood Pressure                   107/69


Pain Intensity [Bilateral        4


Shoulder]                        


Pain Intensity [Neck]            4


Scale Used                       Numeric (1 - 10)


Hx Alcohol Use (MH)              No








Home Medications: 


Ambulatory Orders





Thyroid,Pork [Medora Thyroid] 180 mg PO DAILY 06/11/14 


Citalopram Hydrobromide [CeleXA] 20 mg PO DAILY 05/02/17 


Rosuvastatin Calcium [Crestor] 10 mg PO DAILY 03/06/19 


Gabapentin [Neurontin] 400 mg PO TID #90 cap 08/22/19 


HYDROcodone/APAP 7.5-325MG [Norco 7.5-325] 1 tab PO TID 08/22/19 


Multivitamins, Thera [Multivitamin (formulary)] 1 tab PO DAILY 08/22/19 


Butalbit/Acetamin/Caff/Codeine [Butal/APAP/Caff/Cod -40-30MG] 1 tab PO Q6H

PRN 10/11/19 


Meloxicam [Mobic] 7.5 mg PO DAILY 10/11/19 


Methocarbamol [Robaxin] 500 mg PO TID PRN 10/11/19 











Controlled Substance Measures





- Controlled Substance Measures


Is patient prescribed a controlled substance at discharge?: Yes


When asked, does pt state using other controlled substances?: No


If prescribed controlled substance>3 days was MAPS reviewed?: Yes


If Rx opioid, was Start Talking consent form obtained?: Yes


If opioid is for acute pain is fill amount 7 days or less?: No


Was information provided regarding opioid addiction?: Yes

## 2019-10-23 NOTE — P.PCN
Date of Procedure: 10/23/19


Procedure(s) Performed: 











PREOPERATIVE DIAGNOSIS   :   1--myofascial pain syndrome cervical and lumbar 

area





POSTOPERATIVE DIAGNOSIS:  Same as preop diagnosis


 


PROCEDURE: Trigger point injection in the right trapezius and right 

suprascapular , left trapezius and Left suprascapular    and  the right side 

lumbar paravertebral muscles





ANESTHESIA: none .


EBL: Minimal





COMPLICATION: None.





IV FLUIDS: 100 mL of normal saline.


 


PROCEDURE INDICATION: Chronic low back pain secondary to myofascial pain 

syndrome lumbar area , unresponsive to conservative treatment.  





PROCEDURE DESCRIPTION:  the patient was seen and identified in the preop holding

area , risks and benefits and possible complications of the procedure and 

alternative                                                                     

                                                                                

                                                                                

                                            were discussed with the patient, and

the patient agreed  to proceed with the procedure and signed the consent        

                                                                                

                                                 


The back area prepped with chlorhexidine 3 and sterile technique each of the 

trigger point that was marked in the preop holding area 2 on the right  side 

lumbar paravertebral muscles ,each one of them injected with bupivacaine 0.5% 2 

mL, using 25-gauge needle injection done after negative aspiration and there was

no paresthesia during the injection,


And after that we did the trigger point injection for the cervical right and l

eft trapezius muscles on the right and left suprascapular muscles each trigger 

point injected with bupivacaine 0.5% 2 mL injected at each trigger point, 

ejection after negative aspiration and underwent paresthesia during the 

injection, patient tolerated the procedure well without any complications


 a bandage applied after the skin was cleaned the cleaning solution patient 

taken to recovery room in stable condition and monitors in the recovery room for

20-30 minutes and discharged home in stable condition after discharge criteria 

met and patient will follow up with the pain clinic in 2-4 weeks